# Patient Record
Sex: MALE | Race: WHITE | NOT HISPANIC OR LATINO | Employment: FULL TIME | URBAN - METROPOLITAN AREA
[De-identification: names, ages, dates, MRNs, and addresses within clinical notes are randomized per-mention and may not be internally consistent; named-entity substitution may affect disease eponyms.]

---

## 2018-04-27 ENCOUNTER — OFFICE VISIT CONVERTED (OUTPATIENT)
Dept: FAMILY MEDICINE CLINIC | Facility: CLINIC | Age: 48
End: 2018-04-27
Attending: NURSE PRACTITIONER

## 2018-10-29 ENCOUNTER — OFFICE VISIT CONVERTED (OUTPATIENT)
Dept: FAMILY MEDICINE CLINIC | Facility: CLINIC | Age: 48
End: 2018-10-29
Attending: NURSE PRACTITIONER

## 2019-04-16 ENCOUNTER — HOSPITAL ENCOUNTER (OUTPATIENT)
Dept: OTHER | Facility: HOSPITAL | Age: 49
Discharge: HOME | End: 2019-04-16

## 2019-04-16 LAB
ALBUMIN SERPL-MCNC: 4.3 G/DL (ref 3.5–5)
ALBUMIN/GLOB SERPL: 1.7 {RATIO} (ref 1.4–2.6)
ALP SERPL-CCNC: 46 U/L (ref 53–128)
ALT SERPL-CCNC: 65 U/L (ref 10–40)
ANION GAP SERPL CALC-SCNC: 16 MMOL/L (ref 8–19)
AST SERPL-CCNC: 39 U/L (ref 15–50)
BASOPHILS # BLD AUTO: 0.04 10*3/UL (ref 0–0.2)
BASOPHILS NFR BLD AUTO: 0.6 % (ref 0–3)
BILIRUB SERPL-MCNC: 0.49 MG/DL (ref 0.2–1.3)
BUN SERPL-MCNC: 12 MG/DL (ref 5–25)
BUN/CREAT SERPL: 11 {RATIO} (ref 6–20)
CALCIUM SERPL-MCNC: 9 MG/DL (ref 8.7–10.4)
CHLORIDE SERPL-SCNC: 103 MMOL/L (ref 99–111)
CHOLEST SERPL-MCNC: 174 MG/DL (ref 107–200)
CHOLEST/HDLC SERPL: 4.6 {RATIO} (ref 3–6)
CONV ABS IMM GRAN: 0.01 10*3/UL (ref 0–0.2)
CONV CO2: 24 MMOL/L (ref 22–32)
CONV IMMATURE GRAN: 0.2 % (ref 0–1.8)
CONV TOTAL PROTEIN: 6.8 G/DL (ref 6.3–8.2)
CREAT UR-MCNC: 1.05 MG/DL (ref 0.7–1.2)
DEPRECATED RDW RBC AUTO: 40.4 FL (ref 35.1–43.9)
EOSINOPHIL # BLD AUTO: 0.16 10*3/UL (ref 0–0.7)
EOSINOPHIL # BLD AUTO: 2.6 % (ref 0–7)
ERYTHROCYTE [DISTWIDTH] IN BLOOD BY AUTOMATED COUNT: 11.8 % (ref 11.6–14.4)
EST. AVERAGE GLUCOSE BLD GHB EST-MCNC: 146 MG/DL
GFR SERPLBLD BASED ON 1.73 SQ M-ARVRAT: >60 ML/MIN/{1.73_M2}
GLOBULIN UR ELPH-MCNC: 2.5 G/DL (ref 2–3.5)
GLUCOSE SERPL-MCNC: 152 MG/DL (ref 70–99)
HBA1C MFR BLD: 15.2 G/DL (ref 14–18)
HBA1C MFR BLD: 6.7 % (ref 3.5–5.7)
HCT VFR BLD AUTO: 44.2 % (ref 42–52)
HDLC SERPL-MCNC: 38 MG/DL (ref 40–60)
LDLC SERPL CALC-MCNC: 86 MG/DL (ref 70–100)
LYMPHOCYTES # BLD AUTO: 1.73 10*3/UL (ref 1–5)
MCH RBC QN AUTO: 32.1 PG (ref 27–31)
MCHC RBC AUTO-ENTMCNC: 34.4 G/DL (ref 33–37)
MCV RBC AUTO: 93.4 FL (ref 80–96)
MONOCYTES # BLD AUTO: 0.57 10*3/UL (ref 0.2–1.2)
MONOCYTES NFR BLD AUTO: 9.2 % (ref 3–10)
NEUTROPHILS # BLD AUTO: 3.68 10*3/UL (ref 2–8)
NEUTROPHILS NFR BLD AUTO: 59.5 % (ref 30–85)
NRBC CBCN: 0 % (ref 0–0.7)
OSMOLALITY SERPL CALC.SUM OF ELEC: 291 MOSM/KG (ref 273–304)
PLATELET # BLD AUTO: 205 10*3/UL (ref 130–400)
PMV BLD AUTO: 11.2 FL (ref 9.4–12.4)
POTASSIUM SERPL-SCNC: 4.4 MMOL/L (ref 3.5–5.3)
RBC # BLD AUTO: 4.73 10*6/UL (ref 4.7–6.1)
SODIUM SERPL-SCNC: 139 MMOL/L (ref 135–147)
TRIGL SERPL-MCNC: 248 MG/DL (ref 40–150)
TSH SERPL-ACNC: 6.3 M[IU]/L (ref 0.27–4.2)
VARIANT LYMPHS NFR BLD MANUAL: 27.9 % (ref 20–45)
VLDLC SERPL-MCNC: 50 MG/DL (ref 5–37)
WBC # BLD AUTO: 6.19 10*3/UL (ref 4.8–10.8)

## 2019-04-29 ENCOUNTER — OFFICE VISIT CONVERTED (OUTPATIENT)
Dept: FAMILY MEDICINE CLINIC | Facility: CLINIC | Age: 49
End: 2019-04-29
Attending: NURSE PRACTITIONER

## 2019-04-29 ENCOUNTER — CONVERSION ENCOUNTER (OUTPATIENT)
Dept: FAMILY MEDICINE CLINIC | Facility: CLINIC | Age: 49
End: 2019-04-29

## 2019-10-24 ENCOUNTER — HOSPITAL ENCOUNTER (OUTPATIENT)
Dept: OTHER | Facility: HOSPITAL | Age: 49
Discharge: HOME | End: 2019-10-24
Attending: NURSE PRACTITIONER

## 2019-10-24 LAB
ALBUMIN SERPL-MCNC: 4.8 G/DL (ref 3.5–5)
ALBUMIN/GLOB SERPL: 2 {RATIO} (ref 1.4–2.6)
ALP SERPL-CCNC: 54 U/L (ref 53–128)
ALT SERPL-CCNC: 48 U/L (ref 10–40)
ANION GAP SERPL CALC-SCNC: 19 MMOL/L (ref 8–19)
AST SERPL-CCNC: 30 U/L (ref 15–50)
BASOPHILS # BLD AUTO: 0.06 10*3/UL (ref 0–0.2)
BASOPHILS NFR BLD AUTO: 0.9 % (ref 0–3)
BILIRUB SERPL-MCNC: 0.65 MG/DL (ref 0.2–1.3)
BUN SERPL-MCNC: 16 MG/DL (ref 5–25)
BUN/CREAT SERPL: 16 {RATIO} (ref 6–20)
CALCIUM SERPL-MCNC: 9.4 MG/DL (ref 8.7–10.4)
CHLORIDE SERPL-SCNC: 100 MMOL/L (ref 99–111)
CHOLEST SERPL-MCNC: 178 MG/DL (ref 107–200)
CHOLEST/HDLC SERPL: 4.9 {RATIO} (ref 3–6)
CONV ABS IMM GRAN: 0.01 10*3/UL (ref 0–0.2)
CONV CO2: 24 MMOL/L (ref 22–32)
CONV CREATININE URINE, RANDOM: 71.2 MG/DL (ref 10–300)
CONV IMMATURE GRAN: 0.2 % (ref 0–1.8)
CONV MICROALBUM.,U,RANDOM: <12 MG/L (ref 0–20)
CONV TOTAL PROTEIN: 7.2 G/DL (ref 6.3–8.2)
CREAT UR-MCNC: 1.01 MG/DL (ref 0.7–1.2)
DEPRECATED RDW RBC AUTO: 41.7 FL (ref 35.1–43.9)
EOSINOPHIL # BLD AUTO: 0.14 10*3/UL (ref 0–0.7)
EOSINOPHIL # BLD AUTO: 2.1 % (ref 0–7)
ERYTHROCYTE [DISTWIDTH] IN BLOOD BY AUTOMATED COUNT: 12 % (ref 11.6–14.4)
EST. AVERAGE GLUCOSE BLD GHB EST-MCNC: 154 MG/DL
GFR SERPLBLD BASED ON 1.73 SQ M-ARVRAT: >60 ML/MIN/{1.73_M2}
GLOBULIN UR ELPH-MCNC: 2.4 G/DL (ref 2–3.5)
GLUCOSE SERPL-MCNC: 161 MG/DL (ref 70–99)
HBA1C MFR BLD: 7 % (ref 3.5–5.7)
HCT VFR BLD AUTO: 46.2 % (ref 42–52)
HDLC SERPL-MCNC: 36 MG/DL (ref 40–60)
HGB BLD-MCNC: 15.6 G/DL (ref 14–18)
LDLC SERPL CALC-MCNC: 76 MG/DL (ref 70–100)
LYMPHOCYTES # BLD AUTO: 1.87 10*3/UL (ref 1–5)
LYMPHOCYTES NFR BLD AUTO: 28.6 % (ref 20–45)
MCH RBC QN AUTO: 32.2 PG (ref 27–31)
MCHC RBC AUTO-ENTMCNC: 33.8 G/DL (ref 33–37)
MCV RBC AUTO: 95.5 FL (ref 80–96)
MICROALBUMIN/CREAT UR: 16.9 MG/G{CRE} (ref 0–25)
MONOCYTES # BLD AUTO: 0.55 10*3/UL (ref 0.2–1.2)
MONOCYTES NFR BLD AUTO: 8.4 % (ref 3–10)
NEUTROPHILS # BLD AUTO: 3.91 10*3/UL (ref 2–8)
NEUTROPHILS NFR BLD AUTO: 59.8 % (ref 30–85)
NRBC CBCN: 0 % (ref 0–0.7)
OSMOLALITY SERPL CALC.SUM OF ELEC: 291 MOSM/KG (ref 273–304)
PLATELET # BLD AUTO: 210 10*3/UL (ref 130–400)
PMV BLD AUTO: 11.2 FL (ref 9.4–12.4)
POTASSIUM SERPL-SCNC: 4.7 MMOL/L (ref 3.5–5.3)
RBC # BLD AUTO: 4.84 10*6/UL (ref 4.7–6.1)
SODIUM SERPL-SCNC: 138 MMOL/L (ref 135–147)
T4 FREE SERPL-MCNC: 1.5 NG/DL (ref 0.9–1.8)
TRIGL SERPL-MCNC: 332 MG/DL (ref 40–150)
TSH SERPL-ACNC: 3.37 M[IU]/L (ref 0.27–4.2)
VLDLC SERPL-MCNC: 66 MG/DL (ref 5–37)
WBC # BLD AUTO: 6.54 10*3/UL (ref 4.8–10.8)

## 2019-10-29 ENCOUNTER — OFFICE VISIT CONVERTED (OUTPATIENT)
Dept: FAMILY MEDICINE CLINIC | Facility: CLINIC | Age: 49
End: 2019-10-29
Attending: NURSE PRACTITIONER

## 2020-02-26 ENCOUNTER — HOSPITAL ENCOUNTER (OUTPATIENT)
Dept: URGENT CARE | Facility: CLINIC | Age: 50
Discharge: HOME | End: 2020-02-26
Attending: NURSE PRACTITIONER

## 2020-02-28 LAB — BACTERIA SPEC AEROBE CULT: NORMAL

## 2020-04-23 ENCOUNTER — HOSPITAL ENCOUNTER (OUTPATIENT)
Dept: OTHER | Facility: HOSPITAL | Age: 50
Discharge: HOME | End: 2020-04-23

## 2020-04-23 LAB
ALBUMIN SERPL-MCNC: 4.5 G/DL (ref 3.5–5)
ALBUMIN/GLOB SERPL: 1.7 {RATIO} (ref 1.4–2.6)
ALP SERPL-CCNC: 51 U/L (ref 53–128)
ALT SERPL-CCNC: 70 U/L (ref 10–40)
ANION GAP SERPL CALC-SCNC: 19 MMOL/L (ref 8–19)
AST SERPL-CCNC: 41 U/L (ref 15–50)
BASOPHILS # BLD AUTO: 0.05 10*3/UL (ref 0–0.2)
BASOPHILS NFR BLD AUTO: 0.7 % (ref 0–3)
BILIRUB SERPL-MCNC: 0.65 MG/DL (ref 0.2–1.3)
BUN SERPL-MCNC: 15 MG/DL (ref 5–25)
BUN/CREAT SERPL: 15 {RATIO} (ref 6–20)
CALCIUM SERPL-MCNC: 9.2 MG/DL (ref 8.7–10.4)
CHLORIDE SERPL-SCNC: 101 MMOL/L (ref 99–111)
CHOLEST SERPL-MCNC: 179 MG/DL (ref 107–200)
CHOLEST/HDLC SERPL: 5.3 {RATIO} (ref 3–6)
CONV ABS IMM GRAN: 0.02 10*3/UL (ref 0–0.2)
CONV CO2: 21 MMOL/L (ref 22–32)
CONV IMMATURE GRAN: 0.3 % (ref 0–1.8)
CONV TOTAL PROTEIN: 7.2 G/DL (ref 6.3–8.2)
CREAT UR-MCNC: 0.98 MG/DL (ref 0.7–1.2)
DEPRECATED RDW RBC AUTO: 40.1 FL (ref 35.1–43.9)
EOSINOPHIL # BLD AUTO: 0.25 10*3/UL (ref 0–0.7)
EOSINOPHIL # BLD AUTO: 3.5 % (ref 0–7)
ERYTHROCYTE [DISTWIDTH] IN BLOOD BY AUTOMATED COUNT: 11.7 % (ref 11.6–14.4)
EST. AVERAGE GLUCOSE BLD GHB EST-MCNC: 151 MG/DL
GFR SERPLBLD BASED ON 1.73 SQ M-ARVRAT: >60 ML/MIN/{1.73_M2}
GLOBULIN UR ELPH-MCNC: 2.7 G/DL (ref 2–3.5)
GLUCOSE SERPL-MCNC: 170 MG/DL (ref 70–99)
HBA1C MFR BLD: 6.9 % (ref 3.5–5.7)
HCT VFR BLD AUTO: 45.6 % (ref 42–52)
HDLC SERPL-MCNC: 34 MG/DL (ref 40–60)
HGB BLD-MCNC: 15.6 G/DL (ref 14–18)
LDLC SERPL CALC-MCNC: 104 MG/DL (ref 70–100)
LYMPHOCYTES # BLD AUTO: 1.54 10*3/UL (ref 1–5)
LYMPHOCYTES NFR BLD AUTO: 21.8 % (ref 20–45)
MCH RBC QN AUTO: 31.9 PG (ref 27–31)
MCHC RBC AUTO-ENTMCNC: 34.2 G/DL (ref 33–37)
MCV RBC AUTO: 93.3 FL (ref 80–96)
MONOCYTES # BLD AUTO: 0.65 10*3/UL (ref 0.2–1.2)
MONOCYTES NFR BLD AUTO: 9.2 % (ref 3–10)
NEUTROPHILS # BLD AUTO: 4.56 10*3/UL (ref 2–8)
NEUTROPHILS NFR BLD AUTO: 64.5 % (ref 30–85)
NRBC CBCN: 0 % (ref 0–0.7)
OSMOLALITY SERPL CALC.SUM OF ELEC: 289 MOSM/KG (ref 273–304)
PLATELET # BLD AUTO: 237 10*3/UL (ref 130–400)
PMV BLD AUTO: 10.6 FL (ref 9.4–12.4)
POTASSIUM SERPL-SCNC: 4.2 MMOL/L (ref 3.5–5.3)
RBC # BLD AUTO: 4.89 10*6/UL (ref 4.7–6.1)
SODIUM SERPL-SCNC: 137 MMOL/L (ref 135–147)
TRIGL SERPL-MCNC: 206 MG/DL (ref 40–150)
TSH SERPL-ACNC: 3.45 M[IU]/L (ref 0.27–4.2)
VLDLC SERPL-MCNC: 41 MG/DL (ref 5–37)
WBC # BLD AUTO: 7.07 10*3/UL (ref 4.8–10.8)

## 2020-05-01 ENCOUNTER — OFFICE VISIT CONVERTED (OUTPATIENT)
Dept: FAMILY MEDICINE CLINIC | Facility: CLINIC | Age: 50
End: 2020-05-01
Attending: NURSE PRACTITIONER

## 2020-08-03 ENCOUNTER — HOSPITAL ENCOUNTER (OUTPATIENT)
Dept: CT IMAGING | Facility: HOSPITAL | Age: 50
Discharge: HOME | End: 2020-08-03
Attending: NURSE PRACTITIONER

## 2020-09-07 ENCOUNTER — HOSPITAL ENCOUNTER (OUTPATIENT)
Dept: PREADMISSION TESTING | Facility: HOSPITAL | Age: 50
Discharge: HOME | End: 2020-09-07
Attending: OTOLARYNGOLOGY

## 2020-09-08 LAB — SARS-COV-2 RNA SPEC QL NAA+PROBE: NOT DETECTED

## 2020-09-09 ENCOUNTER — HOSPITAL ENCOUNTER (OUTPATIENT)
Dept: OTHER | Facility: HOSPITAL | Age: 50
Discharge: HOME | End: 2020-09-09
Attending: OTOLARYNGOLOGY

## 2020-09-09 LAB
ANION GAP SERPL CALC-SCNC: 15 MMOL/L (ref 8–19)
APTT BLD: 23.2 S (ref 22.2–34.2)
BASOPHILS # BLD AUTO: 0.04 10*3/UL (ref 0–0.2)
BASOPHILS NFR BLD AUTO: 0.6 % (ref 0–3)
BUN SERPL-MCNC: 9 MG/DL (ref 5–25)
BUN/CREAT SERPL: 10 {RATIO} (ref 6–20)
CALCIUM SERPL-MCNC: 9.6 MG/DL (ref 8.7–10.4)
CHLORIDE SERPL-SCNC: 101 MMOL/L (ref 99–111)
CONV ABS IMM GRAN: 0.05 10*3/UL (ref 0–0.2)
CONV CO2: 25 MMOL/L (ref 22–32)
CONV IMMATURE GRAN: 0.7 % (ref 0–1.8)
CREAT UR-MCNC: 0.91 MG/DL (ref 0.7–1.2)
DEPRECATED RDW RBC AUTO: 39.2 FL (ref 35.1–43.9)
EOSINOPHIL # BLD AUTO: 0.16 10*3/UL (ref 0–0.7)
EOSINOPHIL # BLD AUTO: 2.3 % (ref 0–7)
ERYTHROCYTE [DISTWIDTH] IN BLOOD BY AUTOMATED COUNT: 11.5 % (ref 11.6–14.4)
GFR SERPLBLD BASED ON 1.73 SQ M-ARVRAT: >60 ML/MIN/{1.73_M2}
GLUCOSE SERPL-MCNC: 235 MG/DL (ref 70–99)
HCT VFR BLD AUTO: 45.8 % (ref 42–52)
HGB BLD-MCNC: 15.8 G/DL (ref 14–18)
INR PPP: 0.94 (ref 2–3)
LYMPHOCYTES # BLD AUTO: 1.85 10*3/UL (ref 1–5)
LYMPHOCYTES NFR BLD AUTO: 26.8 % (ref 20–45)
MCH RBC QN AUTO: 32.4 PG (ref 27–31)
MCHC RBC AUTO-ENTMCNC: 34.5 G/DL (ref 33–37)
MCV RBC AUTO: 93.9 FL (ref 80–96)
MONOCYTES # BLD AUTO: 0.54 10*3/UL (ref 0.2–1.2)
MONOCYTES NFR BLD AUTO: 7.8 % (ref 3–10)
NEUTROPHILS # BLD AUTO: 4.26 10*3/UL (ref 2–8)
NEUTROPHILS NFR BLD AUTO: 61.8 % (ref 30–85)
NRBC CBCN: 0 % (ref 0–0.7)
OSMOLALITY SERPL CALC.SUM OF ELEC: 290 MOSM/KG (ref 273–304)
PLATELET # BLD AUTO: 234 10*3/UL (ref 130–400)
PMV BLD AUTO: 11 FL (ref 9.4–12.4)
POTASSIUM SERPL-SCNC: 4.2 MMOL/L (ref 3.5–5.3)
PROTHROMBIN TIME: 10.3 S (ref 9.4–12)
RBC # BLD AUTO: 4.88 10*6/UL (ref 4.7–6.1)
SODIUM SERPL-SCNC: 137 MMOL/L (ref 135–147)
T4 FREE SERPL-MCNC: 1.6 NG/DL (ref 0.9–1.8)
TSH SERPL-ACNC: 1.41 M[IU]/L (ref 0.27–4.2)
WBC # BLD AUTO: 6.9 10*3/UL (ref 4.8–10.8)

## 2020-09-11 ENCOUNTER — HOSPITAL ENCOUNTER (OUTPATIENT)
Dept: PERIOP | Facility: HOSPITAL | Age: 50
Setting detail: HOSPITAL OUTPATIENT SURGERY
Discharge: HOME | End: 2020-09-11
Attending: OTOLARYNGOLOGY

## 2020-09-11 LAB — GLUCOSE BLD-MCNC: 178 MG/DL (ref 70–99)

## 2020-11-09 ENCOUNTER — HOSPITAL ENCOUNTER (OUTPATIENT)
Dept: OTHER | Facility: HOSPITAL | Age: 50
Discharge: HOME | End: 2020-11-09
Attending: NURSE PRACTITIONER

## 2020-11-09 LAB
ALBUMIN SERPL-MCNC: 4.4 G/DL (ref 3.5–5)
ALBUMIN/GLOB SERPL: 1.7 {RATIO} (ref 1.4–2.6)
ALP SERPL-CCNC: 49 U/L (ref 53–128)
ALT SERPL-CCNC: 60 U/L (ref 10–40)
ANION GAP SERPL CALC-SCNC: 19 MMOL/L (ref 8–19)
AST SERPL-CCNC: 35 U/L (ref 15–50)
BILIRUB SERPL-MCNC: 0.74 MG/DL (ref 0.2–1.3)
BUN SERPL-MCNC: 14 MG/DL (ref 5–25)
BUN/CREAT SERPL: 14 {RATIO} (ref 6–20)
CALCIUM SERPL-MCNC: 9.7 MG/DL (ref 8.7–10.4)
CHLORIDE SERPL-SCNC: 103 MMOL/L (ref 99–111)
CHOLEST SERPL-MCNC: 175 MG/DL (ref 107–200)
CHOLEST/HDLC SERPL: 4.9 {RATIO} (ref 3–6)
CONV CO2: 21 MMOL/L (ref 22–32)
CONV CREATININE URINE, RANDOM: 244.9 MG/DL (ref 10–300)
CONV MICROALBUM.,U,RANDOM: 18.1 MG/L (ref 0–20)
CONV TOTAL PROTEIN: 7 G/DL (ref 6.3–8.2)
CREAT UR-MCNC: 1 MG/DL (ref 0.7–1.2)
EST. AVERAGE GLUCOSE BLD GHB EST-MCNC: 171 MG/DL
GFR SERPLBLD BASED ON 1.73 SQ M-ARVRAT: >60 ML/MIN/{1.73_M2}
GLOBULIN UR ELPH-MCNC: 2.6 G/DL (ref 2–3.5)
GLUCOSE SERPL-MCNC: 204 MG/DL (ref 70–99)
HBA1C MFR BLD: 7.6 % (ref 3.5–5.7)
HDLC SERPL-MCNC: 36 MG/DL (ref 40–60)
LDLC SERPL CALC-MCNC: 96 MG/DL (ref 70–100)
MICROALBUMIN/CREAT UR: 7.4 MG/G{CRE} (ref 0–25)
OSMOLALITY SERPL CALC.SUM OF ELEC: 294 MOSM/KG (ref 273–304)
POTASSIUM SERPL-SCNC: 4 MMOL/L (ref 3.5–5.3)
SODIUM SERPL-SCNC: 139 MMOL/L (ref 135–147)
T4 FREE SERPL-MCNC: 1.7 NG/DL (ref 0.9–1.8)
TRIGL SERPL-MCNC: 217 MG/DL (ref 40–150)
TSH SERPL-ACNC: 1.55 M[IU]/L (ref 0.27–4.2)
VLDLC SERPL-MCNC: 43 MG/DL (ref 5–37)

## 2020-11-11 ENCOUNTER — OFFICE VISIT CONVERTED (OUTPATIENT)
Dept: FAMILY MEDICINE CLINIC | Facility: CLINIC | Age: 50
End: 2020-11-11
Attending: NURSE PRACTITIONER

## 2020-12-24 ENCOUNTER — HOSPITAL ENCOUNTER (OUTPATIENT)
Dept: OTHER | Facility: HOSPITAL | Age: 50
Discharge: HOME | End: 2020-12-24
Attending: INTERNAL MEDICINE

## 2021-01-18 ENCOUNTER — HOSPITAL ENCOUNTER (OUTPATIENT)
Dept: OTHER | Facility: HOSPITAL | Age: 51
Discharge: HOME | End: 2021-01-18
Attending: INTERNAL MEDICINE

## 2021-04-19 ENCOUNTER — HOSPITAL ENCOUNTER (OUTPATIENT)
Dept: OTHER | Facility: HOSPITAL | Age: 51
Discharge: HOME | End: 2021-04-19

## 2021-04-19 LAB
ALBUMIN SERPL-MCNC: 4.5 G/DL (ref 3.5–5)
ALBUMIN/GLOB SERPL: 1.8 {RATIO} (ref 1.4–2.6)
ALP SERPL-CCNC: 53 U/L (ref 53–128)
ALT SERPL-CCNC: 57 U/L (ref 10–40)
ANION GAP SERPL CALC-SCNC: 16 MMOL/L (ref 8–19)
AST SERPL-CCNC: 35 U/L (ref 15–50)
BASOPHILS # BLD AUTO: 0.05 10*3/UL (ref 0–0.2)
BASOPHILS NFR BLD AUTO: 0.7 % (ref 0–3)
BILIRUB SERPL-MCNC: 0.54 MG/DL (ref 0.2–1.3)
BUN SERPL-MCNC: 14 MG/DL (ref 5–25)
BUN/CREAT SERPL: 15 {RATIO} (ref 6–20)
CALCIUM SERPL-MCNC: 9 MG/DL (ref 8.7–10.4)
CHLORIDE SERPL-SCNC: 101 MMOL/L (ref 99–111)
CHOLEST SERPL-MCNC: 171 MG/DL (ref 107–200)
CHOLEST/HDLC SERPL: 4.9 {RATIO} (ref 3–6)
CONV ABS IMM GRAN: 0.02 10*3/UL (ref 0–0.2)
CONV CO2: 24 MMOL/L (ref 22–32)
CONV IMMATURE GRAN: 0.3 % (ref 0–1.8)
CONV TOTAL PROTEIN: 7 G/DL (ref 6.3–8.2)
CREAT UR-MCNC: 0.95 MG/DL (ref 0.7–1.2)
DEPRECATED RDW RBC AUTO: 39.6 FL (ref 35.1–43.9)
EOSINOPHIL # BLD AUTO: 0.14 10*3/UL (ref 0–0.7)
EOSINOPHIL # BLD AUTO: 2 % (ref 0–7)
ERYTHROCYTE [DISTWIDTH] IN BLOOD BY AUTOMATED COUNT: 11.7 % (ref 11.6–14.4)
GFR SERPLBLD BASED ON 1.73 SQ M-ARVRAT: >60 ML/MIN/{1.73_M2}
GLOBULIN UR ELPH-MCNC: 2.5 G/DL (ref 2–3.5)
GLUCOSE SERPL-MCNC: 234 MG/DL (ref 70–99)
HCT VFR BLD AUTO: 44.8 % (ref 42–52)
HDLC SERPL-MCNC: 35 MG/DL (ref 40–60)
HGB BLD-MCNC: 15.6 G/DL (ref 14–18)
LDLC SERPL CALC-MCNC: 74 MG/DL (ref 70–100)
LYMPHOCYTES # BLD AUTO: 1.95 10*3/UL (ref 1–5)
LYMPHOCYTES NFR BLD AUTO: 27.7 % (ref 20–45)
MCH RBC QN AUTO: 32.3 PG (ref 27–31)
MCHC RBC AUTO-ENTMCNC: 34.8 G/DL (ref 33–37)
MCV RBC AUTO: 92.8 FL (ref 80–96)
MONOCYTES # BLD AUTO: 0.5 10*3/UL (ref 0.2–1.2)
MONOCYTES NFR BLD AUTO: 7.1 % (ref 3–10)
NEUTROPHILS # BLD AUTO: 4.39 10*3/UL (ref 2–8)
NEUTROPHILS NFR BLD AUTO: 62.2 % (ref 30–85)
NRBC CBCN: 0 % (ref 0–0.7)
OSMOLALITY SERPL CALC.SUM OF ELEC: 290 MOSM/KG (ref 273–304)
PLATELET # BLD AUTO: 203 10*3/UL (ref 130–400)
PMV BLD AUTO: 10.9 FL (ref 9.4–12.4)
POTASSIUM SERPL-SCNC: 4.6 MMOL/L (ref 3.5–5.3)
RBC # BLD AUTO: 4.83 10*6/UL (ref 4.7–6.1)
SODIUM SERPL-SCNC: 136 MMOL/L (ref 135–147)
TRIGL SERPL-MCNC: 309 MG/DL (ref 40–150)
TSH SERPL-ACNC: 1.66 M[IU]/L (ref 0.27–4.2)
VLDLC SERPL-MCNC: 62 MG/DL (ref 5–37)
WBC # BLD AUTO: 7.05 10*3/UL (ref 4.8–10.8)

## 2021-04-20 LAB
EST. AVERAGE GLUCOSE BLD GHB EST-MCNC: 180 MG/DL
HBA1C MFR BLD: 7.9 % (ref 3.5–5.7)

## 2021-05-12 ENCOUNTER — OFFICE VISIT CONVERTED (OUTPATIENT)
Dept: FAMILY MEDICINE CLINIC | Facility: CLINIC | Age: 51
End: 2021-05-12
Attending: NURSE PRACTITIONER

## 2021-05-13 NOTE — PROGRESS NOTES
Progress Note      Patient Name: Jaciel Shaw   Patient ID: [id number]   Sex: Male   YOB: 1970    Primary Care Provider: Ryan AVALOS    Visit Date: November 11, 2020    Provider: BAILEY Whitaker   Location: [facility] [place]   Location Address: [address] [place] [address], [address] [place] [address]  [id number]   Location Phone: [phone]          Chief Complaint  · 6 month follow up       History Of Present Illness  Jaciel Shaw is a 50 year old /White male who presents for evaluation and treatment of:      Patient presents to the clinic today for a 6 month follow up. He denies any issues or complaints at this time. He does need refills. We discussed his labs. His thyroid levels were within range. His A1C was elevated at 7.6%. He admits to eating unhealthier over the last few weeks. He does not check his sugars at home. He is taking the metformin 1000 BID. We discussed dietary modifications and limiting his sugar and carb intake. He wants to try this before new medications.  His blood pressure is well controlled today 124/82.       Past Medical History  Disease Name Date Onset Notes   Diabetes mellitus, type 2 10/27/2017 --    Diabetes Mellitus, Type II-Diet controlled 04/26/2016 --    Hyperlipidemia 10/27/2017 --    Hyperlipidemia, unspecified hyperlipidemia type 04/28/2016 --    Hypothyroidism 10/27/2017 --    Right displaced 5th metacarpal neck fracture.  11/13/2015 --    Seasonal allergies --  --          Past Surgical History  Procedure Name Date Notes   EYE SURGERY 1987 detached retina         Medication List  Name Date Started Instructions   Claritin 10 mg oral tablet  take 1 tablet (10 mg) by oral route once daily   levothyroxine 75 mcg oral tablet 11/11/2020 take 1 tablet (75 mcg) by oral route once daily for 30 days   lisinopril 5 mg oral tablet 11/11/2020 take 1 tablet (5 mg) by oral route once daily for 30 days   metformin 500 mg oral tablet extended  "release 24 hr 11/11/2020 take 2 tablets by oral route 2 times a day for 30 days         Allergy List  Allergen Name Date Reaction Notes   Keflex --  --  --          Family Medical History  Disease Name Relative/Age Notes   Heart Disease Mother/   Mother         Social History  Finding Status Start/Stop Quantity Notes   Alcohol Former --/-- --  --    lives with spouse --  --/-- --  --    . --  --/-- --  --    Moderate Amount of Exercise (1-3 times weekly) --  --/-- --  --    Recreational Drug Use Never --/-- --  no   Tobacco Former --/-- --  former smoker         Immunizations  NameDate Admin Mfg Trade Name Lot Number Route Inj VIS Given VIS Publication   [id number]/24/2019 SKB Fluarix, quadrivalent, preservative free [id number] NE NE 10/29/2019    Comments:          Review of Systems  · Constitutional  o Denies  o : fatigue, night sweats  · Eyes  o Denies  o : double vision, blurred vision  · HENT  o Denies  o : vertigo, recent head injury  · Breasts  o Denies  o : abnormal changes in breast size, additional breast symptoms except as noted in the HPI  · Cardiovascular  o Denies  o : chest pain, irregular heart beats  · Respiratory  o Denies  o : shortness of breath, productive cough  · Gastrointestinal  o Denies  o : nausea, vomiting  · Genitourinary  o Denies  o : dysuria, urinary retention  · Integument  o Denies  o : hair growth change, new skin lesions  · Neurologic  o Denies  o : altered mental status, seizures  · Musculoskeletal  o Denies  o : joint swelling, limitation of motion  · Endocrine  o Denies  o : cold intolerance, heat intolerance  · Heme-Lymph  o Denies  o : petechiae, lymph node enlargement or tenderness  · Allergic-Immunologic  o Denies  o : frequent illnesses      Vitals  Date Time BP Position Site L\R Cuff Size HR RR TEMP (F) WT  HT  BMI kg/m2 BSA m2 O2 Sat FR L/min FiO2 HC       11/11/2020 01:40 /82 Sitting    80 - R  97 217lbs 0oz 5'  9\" 32.04 2.19 97 %            Physical " Examination  · Constitutional  o Appearance  o : well-nourished, in no acute distress  · Eyes  o Conjunctivae  o : conjunctivae normal  o Sclerae  o : sclerae white  o Pupils and Irises  o : pupils equal and round  o Eyelids/Ocular Adnexae  o : eyelid appearance normal, no exudates present  · Neck  o Inspection/Palpation  o : normal appearance, no masses or tenderness, trachea midline  o Range of Motion  o : cervical range of motion within normal limits  o Thyroid  o : gland size normal, nontender, no nodules or masses present on palpation  · Respiratory  o Respiratory Effort  o : breathing unlabored  o Inspection of Chest  o : normal appearance  o Auscultation of Lungs  o : normal breath sounds throughout inspiration and expiration  · Cardiovascular  o Heart  o :   § Auscultation of Heart  § : regular rate and rhythm, no murmurs, gallops or rubs  o Peripheral Vascular System  o :   § Pedal Pulses  § : pulses 2 bilaterally  § Extremities  § : no clubbing or edema  · Lymphatic  o Neck  o : no lymphadenopathy present  · Skin and Subcutaneous Tissue  o General Inspection  o : no rashes or lesions present, no areas of discoloration  o Body Hair  o : hair normal for age, general body hair distribution normal for age  o Digits and Nails  o : no clubbing, cyanosis, deformities or edema present, normal appearing nails  · Neurologic  o Mental Status Examination  o :   § Orientation  § : grossly oriented to person, place and time  o Gait and Station  o : normal gait, able to stand without difficulty  · Psychiatric  o Judgement and Insight  o : judgment and insight intact  o Mood and Affect  o : mood normal, affect appropriate  o Presence of Abnormal Thoughts  o : no hallucinations, no delusions present, no psychotic thoughts  · Left DM Foot Exam  o Sensation  o : normal sensory exam perceptible to 10-gram nylon monofilament exam (5/5), vibration and light touch.  o Visual Inspection  o : visual inspection is normal with no  signs of breakdown, ulcerations or deformities unless otherwise noted.   o Vascular  o : palpable dorsalis pedis and posterir tibialis pulses present, normal capillary refill  · Right DM Foot Exam  o Sensation  o : normal sensory exam perceptible to 10-gram nylon monofilament exam (5/5), vibration and light touch.  o Visual Inspection  o : visual inspection is normal with no signs of breakdown, ulcerations or deformities unless otherwise noted.   o Vascular  o : palpable dorsalis pedis and posterir tibialis pulses present, normal capillary refill                Assessment  · Screening for depression     V79.0/Z13.89  · Diabetes mellitus, type 2     250.00/E11.9  · Essential hypertension     401.9/I10  · Hyperlipidemia     272.4/E78.5      Plan  · Orders  o Annual depression screening, 15 minutes (, [address]) - V79.0/Z13.89 - 11/11/2020  o ACO-18: Negative screen for clinical depression using a standardized tool () - V79.0/Z13.89 - 11/11/2020  o Diabetic Foot (Motor and Sensory) Exam Completed [facility] (, , 2028F) - 250.00/E11.9 - 11/11/2020  o ACO-39: Current medications updated and reviewed (, 1159F) - - 11/11/2020  o ACO-18: Negative screen for clinical depression using a standardized tool () - - 11/11/2020  o ACO-40: Depression Remission at 12 months as demonstrated by a 12 month repeat PHQ-9 of less than 5 () - - 11/11/2020  · Medications  o levothyroxine 75 mcg oral tablet   SIG: take 1 tablet (75 mcg) by oral route once daily for 30 days   DISP: (30) Tablet with 5 refills  Refilled on 11/11/2020     o lisinopril 5 mg oral tablet   SIG: take 1 tablet (5 mg) by oral route once daily for 30 days   DISP: (30) Tablet with 5 refills  Refilled on 11/11/2020     o metformin 500 mg oral tablet extended release 24 hr   SIG: take 2 tablets by oral route 2 times a day for 30 days   DISP: (120) Tablet with 5 refills  Refilled on 11/11/2020     o Medications have been Reconciled  o Transition of  Care or Provider Policy  · Instructions  o Depression Screen completed and scanned into the EMR under the designated folder within the patient's documents.  o Today's PHQ-9 result is _2__  o Continue blood sugar monitoring daily and record. Bring your log to office visits. Call the office for readings below 70 and above 250 or any complications.  o Daily foot care. Avoid walking barefoot. Annual Dilated Eye Exam.  o Discussed with patient blood pressure monitoring, hemoglobin A1C levels need to be below 7.0, and LDL (Lipid) goals below 70.  o Advised that cheeses and other sources of dairy fats, animal fats, fast food, and the extras (candy, pastries, pies, doughnuts and cookies) all contain LDL raising nutrients. Advised to increase fruits, vegetables, whole grains, and to monitor portion sizes.   o Patient was educated/instructed on their diagnosis, treatment and medications prior to discharge from the clinic today.  o Minutes spent with patient including greater than 50% in Education/Counseling/Care Coordination.  o Time spent with the patient was minutes, more than 50% face to face.  o Electronically Identified Patient Education Materials Provided Electronically            Electronically Signed by: BAILEY Whitaker -Author on November 11, 2020 03:56:38 PM

## 2021-05-13 NOTE — PROGRESS NOTES
Progress Note      Patient Name: Jaciel Shaw   Patient ID: [id number]   Sex: Male   YOB: 1970    Primary Care Provider: Ryan AVALOS    Visit Date: May 1, 2020    Provider: BAILEY Whitaker   Location: [facility]   Location Address: [address] [place] [address], [address]  [id number]   Location Phone: [phone]          Chief Complaint  · 6 month follow up for DM2, HTN, Hypothyroidism  · Medication refills  · Lab results      History Of Present Illness  Jaciel Shaw is a 50 year old /White male who presents for evaluation and treatment of:      Patient presents to the office today for six-month follow-up regarding his diabetes, hypothyroidism.  Patient denies any concerns or complaints at this time.  I did review his recent HRA labs that were completed at the hospital.  His A1c was 6.9% which was a reduction from 7.0% during his last visit.  Patient has also lost 9 pounds since his last visit by cutting out sodas including diet sodas.  Patient does state that he is needing refills of all his medications.       Past Medical History  Diabetes mellitus, type 2; Diabetes Mellitus, Type II-Diet controlled; Hyperlipidemia; Hyperlipidemia, unspecified hyperlipidemia type; Hypothyroidism; Right displaced 5th metacarpal neck fracture. ; Seasonal allergies         Past Surgical History  EYE SURGERY         Medication List  Claritin 10 mg oral tablet; levothyroxine 75 mcg oral tablet; lisinopril 5 mg oral tablet; metformin 500 mg oral tablet extended release 24 hr         Allergy List  Keflex         Family Medical History  Heart Disease         Social History  Alcohol (Former); lives with spouse; .; Moderate Amount of Exercise (1-3 times weekly); Recreational Drug Use (Never); Tobacco (Former)         Immunizations  Name Date Admin   Influenza          Review of Systems  · Constitutional  o Denies  o : fever, fatigue, weight loss, weight  "gain  · Cardiovascular  o Denies  o : lower extremity edema, claudication, chest pressure, palpitations  · Respiratory  o Denies  o : shortness of breath, wheezing, cough, hemoptysis, dyspnea on exertion  · Gastrointestinal  o Denies  o : nausea, vomiting, diarrhea, constipation, abdominal pain      Vitals  Date Time BP Position Site L\R Cuff Size HR RR TEMP (F) WT  HT  BMI kg/m2 BSA m2 O2 Sat        05/01/2020 07:36 /74 Sitting    95 - R 18 97.1 212lbs 0oz 5'  9\" 31.31 2.16 97 %          Physical Examination  · Constitutional  o Appearance  o : well-nourished, in no acute distress  · Neck  o Inspection/Palpation  o : normal appearance, no masses or tenderness, trachea midline  o Range of Motion  o : cervical range of motion within normal limits  o Thyroid  o : gland size normal, nontender, no nodules or masses present on palpation  · Respiratory  o Respiratory Effort  o : breathing unlabored  o Inspection of Chest  o : normal appearance  o Auscultation of Lungs  o : normal breath sounds throughout inspiration and expiration  · Cardiovascular  o Heart  o :   § Auscultation of Heart  § : regular rate and rhythm, no murmurs, gallops or rubs  o Peripheral Vascular System  o :   § Extremities  § : no clubbing or edema  · Skin and Subcutaneous Tissue  o General Inspection  o : no rashes or lesions present, no areas of discoloration  o Body Hair  o : hair normal for age, general body hair distribution normal for age  o Digits and Nails  o : no clubbing, cyanosis, deformities or edema present, normal appearing nails  · Neurologic  o Mental Status Examination  o :   § Orientation  § : grossly oriented to person, place and time  o Gait and Station  o : normal gait, able to stand without difficulty  · Psychiatric  o Judgement and Insight  o : judgment and insight intact  o Mood and Affect  o : mood normal, affect appropriate  o Presence of Abnormal Thoughts  o : no hallucinations, no delusions present, no psychotic " thoughts          Assessment  · Diabetes mellitus, type 2     250.00/E11.9  · Essential hypertension     401.9/I10  · Hypothyroidism     244.9/E03.9    Problems Reconciled  Plan  · Orders  o Diabetes 2 Panel (Urine Microalbumin, CMP, Lipid, A1c, ) [facility] (39219, 35725, 90194, 24256) - 250.00/E11.9 - 11/01/2020  o Thyroid Profile (46529, 22890, THYII) - 250.00/E11.9 - 11/01/2020  o ACO-17: Screened for tobacco use AND received tobacco cessation intervention (4004F) - - 05/01/2020  o ACO-39: Current medications updated and reviewed () - - 05/01/2020  o ACO-14: Influenza immunization administered or previously received () - - 05/01/2020  · Medications  o levothyroxine 75 mcg oral tablet   SIG: take 1 tablet (75 mcg) by oral route once daily for 30 days   DISP: (30) tablets with 5 refills  Refilled on 05/01/2020     o lisinopril 5 mg oral tablet   SIG: take 1 tablet (5 mg) by oral route once daily for 30 days   DISP: (30) tablets with 5 refills  Refilled on 05/01/2020     o metformin 500 mg oral tablet extended release 24 hr   SIG: take 2 tablets by oral route 2 times a day for 30 days   DISP: (120) tablets with 5 refills  Refilled on 05/01/2020     o Medications have been Reconciled  o Transition of Care or Provider Policy  · Instructions  o Continue blood sugar monitoring daily and record. Bring your log to office visits. Call the office for readings below 70 and above 250 or any complications.  o Daily foot care. Avoid walking barefoot. Annual Dilated Eye Exam.  o Discussed with patient blood pressure monitoring, hemoglobin A1C levels need to be below 7.0, and LDL (Lipid) goals below 70.  o Patient was educated/instructed on their diagnosis, treatment and medications prior to discharge from the clinic today.  o Time spent with the patient was minutes, more than 50% face to face.  · Disposition  o Call or Return if symptoms worsen or persist.  o follow up in 6 months            Electronically Signed by: Ryan  BAILEY Ambrose -Author on May 1, 2020 07:50:37 AM

## 2021-05-14 VITALS
SYSTOLIC BLOOD PRESSURE: 124 MMHG | OXYGEN SATURATION: 97 % | DIASTOLIC BLOOD PRESSURE: 82 MMHG | HEART RATE: 80 BPM | BODY MASS INDEX: 32.14 KG/M2 | TEMPERATURE: 97 F | HEIGHT: 69 IN | WEIGHT: 217 LBS

## 2021-05-15 VITALS
DIASTOLIC BLOOD PRESSURE: 78 MMHG | RESPIRATION RATE: 16 BRPM | TEMPERATURE: 96.7 F | HEIGHT: 69 IN | SYSTOLIC BLOOD PRESSURE: 120 MMHG | WEIGHT: 221 LBS | BODY MASS INDEX: 32.73 KG/M2 | OXYGEN SATURATION: 98 % | HEART RATE: 84 BPM

## 2021-05-15 VITALS
TEMPERATURE: 97.1 F | HEART RATE: 95 BPM | BODY MASS INDEX: 31.4 KG/M2 | OXYGEN SATURATION: 97 % | SYSTOLIC BLOOD PRESSURE: 120 MMHG | DIASTOLIC BLOOD PRESSURE: 74 MMHG | RESPIRATION RATE: 18 BRPM | WEIGHT: 212 LBS | HEIGHT: 69 IN

## 2021-05-15 VITALS
SYSTOLIC BLOOD PRESSURE: 120 MMHG | OXYGEN SATURATION: 98 % | RESPIRATION RATE: 16 BRPM | WEIGHT: 219 LBS | BODY MASS INDEX: 32.44 KG/M2 | HEIGHT: 69 IN | DIASTOLIC BLOOD PRESSURE: 84 MMHG | HEART RATE: 94 BPM

## 2021-05-16 VITALS
HEIGHT: 69 IN | BODY MASS INDEX: 31.89 KG/M2 | OXYGEN SATURATION: 99 % | DIASTOLIC BLOOD PRESSURE: 88 MMHG | HEART RATE: 96 BPM | RESPIRATION RATE: 16 BRPM | TEMPERATURE: 97.1 F | WEIGHT: 215.31 LBS | SYSTOLIC BLOOD PRESSURE: 145 MMHG

## 2021-05-16 VITALS
HEART RATE: 94 BPM | TEMPERATURE: 96.6 F | WEIGHT: 215 LBS | SYSTOLIC BLOOD PRESSURE: 140 MMHG | BODY MASS INDEX: 31.84 KG/M2 | OXYGEN SATURATION: 97 % | RESPIRATION RATE: 16 BRPM | HEIGHT: 69 IN | DIASTOLIC BLOOD PRESSURE: 86 MMHG

## 2021-06-05 NOTE — PROGRESS NOTES
Progress Note      Patient Name: Jaciel Shaw   Patient ID: [id number]   Sex: Male   YOB: 1970    Primary Care Provider: Ryan AVALOS    Visit Date: May 12, 2021    Provider: BAILEY Whitaker   Location: [facility]   Location Address: [address] [place] [address], [address]  [place], [place]  [id number]   Location Phone: [phone]          Chief Complaint  · Physical and refills      History Of Present Illness  Jaciel Shaw is a 51 year old /White male who presents for evaluation and treatment of:      Patient presents to the office today for a wellness physical.  He states that he is doing well and denies any concerns or complaints at this time.  Patient does state that his father passed away last April and he is dealing with his uncle and grandfather who both live in nursing homes.  He states that this added stress has caused him to stress eat which is in turn to increase his A1c and his blood sugars.  Patient states that he is well aware of what has caused these to increase numbers and states that he is going to work on this to improve them.       Past Medical History  Disease Name Date Onset Notes   Diabetes mellitus, type 2 10/27/2017 --    Diabetes Mellitus, Type II-Diet controlled 04/26/2016 --    Hyperlipidemia 10/27/2017 --    Hyperlipidemia, unspecified hyperlipidemia type 04/28/2016 --    Hypothyroidism 10/27/2017 --    Right displaced 5th metacarpal neck fracture.  11/13/2015 --    Seasonal allergies --  --          Past Surgical History  Procedure Name Date Notes   EYE SURGERY 1987 detached retina         Medication List  Name Date Started Instructions   Claritin 10 mg oral tablet  take 1 tablet (10 mg) by oral route once daily   levothyroxine 75 mcg oral tablet 11/11/2020 take 1 tablet (75 mcg) by oral route once daily for 30 days   lisinopril 5 mg oral tablet 11/11/2020 take 1 tablet (5 mg) by oral route once daily for 30 days   metformin 500 mg oral  "tablet extended release 24 hr 11/11/2020 take 2 tablets by oral route 2 times a day for 30 days         Allergy List  Allergen Name Date Reaction Notes   Keflex --  --  --          Family Medical History  Disease Name Relative/Age Notes   Heart Disease Mother/   Mother         Social History  Finding Status Start/Stop Quantity Notes   Alcohol Former --/-- --  --    lives with spouse --  --/-- --  --    . --  --/-- --  --    Moderate Amount of Exercise (1-3 times weekly) --  --/-- --  --    Recreational Drug Use Never --/-- --  no   Tobacco Former --/-- --  former smoker         Immunizations  NameDate Admin Mfg Trade Name Lot Number Route Inj VIS Given VIS Publication   [id number]/15/2020 SKB Fluarix, quadrivalent, preservative free [id number] NE NE 05/12/2021    Comments:          Review of Systems  · Constitutional  o Denies  o : fever, fatigue, weight loss, weight gain  · Cardiovascular  o Denies  o : lower extremity edema, claudication, chest pressure, palpitations  · Respiratory  o Denies  o : shortness of breath, wheezing, cough, hemoptysis, dyspnea on exertion  · Gastrointestinal  o Denies  o : nausea, vomiting, diarrhea, constipation, abdominal pain      Vitals  Date Time BP Position Site L\R Cuff Size HR RR TEMP (F) WT  HT  BMI kg/m2 BSA m2 O2 Sat FR L/min FiO2 HC       05/12/2021 07:24 /84 Sitting    86 - R  97.4 214lbs 16oz 5'  9\" 31.75 2.18 98 %            Physical Examination  · Constitutional  o Appearance  o : well-nourished, in no acute distress  · Neck  o Inspection/Palpation  o : normal appearance, no masses or tenderness, trachea midline  o Range of Motion  o : cervical range of motion within normal limits  o Thyroid  o : gland size normal, nontender, no nodules or masses present on palpation  · Respiratory  o Respiratory Effort  o : breathing unlabored  o Inspection of Chest  o : normal appearance  o Auscultation of Lungs  o : normal breath sounds throughout inspiration and " expiration  · Cardiovascular  o Heart  o :   § Auscultation of Heart  § : regular rate and rhythm, no murmurs, gallops or rubs  o Peripheral Vascular System  o :   § Extremities  § : no clubbing or edema  · Gastrointestinal  o Abdominal Examination  o : abdomen nontender to palpation, tone normal without rigidity or guarding, no masses present, bowel sounds present in all four quadrants  · Skin and Subcutaneous Tissue  o General Inspection  o : no rashes or lesions present, no areas of discoloration  o Body Hair  o : hair normal for age, general body hair distribution normal for age  o Digits and Nails  o : no clubbing, cyanosis, deformities or edema present, normal appearing nails  · Neurologic  o Mental Status Examination  o :   § Orientation  § : grossly oriented to person, place and time  o Gait and Station  o : normal gait, able to stand without difficulty  · Psychiatric  o Judgement and Insight  o : judgment and insight intact  o Mood and Affect  o : mood normal, affect appropriate  o Presence of Abnormal Thoughts  o : no hallucinations, no delusions present, no psychotic thoughts          Assessment  · Diabetes mellitus, type 2     250.00/E11.9  · Hypothyroidism     244.9/E03.9  · Well adult exam     V70.0/Z00.00      Plan  · Orders  o ACO-14: Influenza immunization administered or previously received [facility] () - - 05/12/2021  o ACO-39: Current medications updated and reviewed (, 1159F) - - 05/12/2021  · Medications  o Claritin 10 mg oral tablet   SIG: take 1 tablet (10 mg) by oral route once daily   DISP: (90) Tablet with 3 refills  Adjusted on 05/12/2021     o metformin 1,000 mg oral tablet extended release 24hr   SIG: take 1 tablet (1,000 mg) by oral route 2 times per day with meals for 90 days   DISP: (180) Tablet with 1 refills  Adjusted on 05/12/2021     o levothyroxine 75 mcg oral tablet   SIG: take 1 tablet (75 mcg) by oral route once daily for 90 days   DISP: (90) Tablet with 1  refills  Refilled on 05/12/2021     o lisinopril 5 mg oral tablet   SIG: take 1 tablet (5 mg) by oral route once daily for 90 days   DISP: (90) Tablet with 1 refills  Refilled on 05/12/2021     o Medications have been Reconciled  o Transition of Care or Provider Policy  · Instructions  o Continue blood sugar monitoring daily and record. Bring your log to office visits. Call the office for readings below 70 and above 250 or any complications.  o Daily foot care. Avoid walking barefoot. Annual Dilated Eye Exam.  o Discussed with patient blood pressure monitoring, hemoglobin A1C levels need to be below 7.0, and LDL (Lipid) goals below 70.  o Patient was educated/instructed on their diagnosis, treatment and medications prior to discharge from the clinic today.  o Minutes spent with patient including greater than 50% in Education/Counseling/Care Coordination.  o Time spent with the patient was minutes, more than 50% face to face.  o Electronically Identified Patient Education Materials Provided Electronically  · Disposition  o Call or Return if symptoms worsen or persist.  o follow up in 6 months            Electronically Signed by: BAILEY Whitaker -Author on May 12, 2021 07:43:21 AM

## 2021-07-15 VITALS
BODY MASS INDEX: 31.84 KG/M2 | SYSTOLIC BLOOD PRESSURE: 122 MMHG | HEART RATE: 86 BPM | WEIGHT: 215 LBS | DIASTOLIC BLOOD PRESSURE: 84 MMHG | OXYGEN SATURATION: 98 % | TEMPERATURE: 97.4 F | HEIGHT: 69 IN

## 2021-11-12 DIAGNOSIS — Z13.220 SCREENING FOR LIPID DISORDERS: ICD-10-CM

## 2021-11-12 DIAGNOSIS — E03.9 HYPOTHYROIDISM, UNSPECIFIED TYPE: Primary | ICD-10-CM

## 2021-11-12 DIAGNOSIS — E11.9 TYPE 2 DIABETES MELLITUS WITHOUT COMPLICATION, WITHOUT LONG-TERM CURRENT USE OF INSULIN (HCC): ICD-10-CM

## 2021-11-15 ENCOUNTER — LAB (OUTPATIENT)
Dept: LAB | Facility: HOSPITAL | Age: 51
End: 2021-11-15

## 2021-11-15 DIAGNOSIS — E03.9 HYPOTHYROIDISM, UNSPECIFIED TYPE: ICD-10-CM

## 2021-11-15 DIAGNOSIS — Z13.220 SCREENING FOR LIPID DISORDERS: ICD-10-CM

## 2021-11-15 DIAGNOSIS — E11.9 TYPE 2 DIABETES MELLITUS WITHOUT COMPLICATION, WITHOUT LONG-TERM CURRENT USE OF INSULIN (HCC): ICD-10-CM

## 2021-11-15 LAB
ALBUMIN SERPL-MCNC: 4.5 G/DL (ref 3.5–5.2)
ALBUMIN/GLOB SERPL: 1.8 G/DL
ALP SERPL-CCNC: 50 U/L (ref 39–117)
ALT SERPL W P-5'-P-CCNC: 46 U/L (ref 1–41)
ANION GAP SERPL CALCULATED.3IONS-SCNC: 7 MMOL/L (ref 5–15)
AST SERPL-CCNC: 25 U/L (ref 1–40)
BILIRUB SERPL-MCNC: 0.4 MG/DL (ref 0–1.2)
BUN SERPL-MCNC: 17 MG/DL (ref 6–20)
BUN/CREAT SERPL: 15.7 (ref 7–25)
CALCIUM SPEC-SCNC: 9.5 MG/DL (ref 8.6–10.5)
CHLORIDE SERPL-SCNC: 100 MMOL/L (ref 98–107)
CHOLEST SERPL-MCNC: 192 MG/DL (ref 0–200)
CO2 SERPL-SCNC: 28 MMOL/L (ref 22–29)
CREAT SERPL-MCNC: 1.08 MG/DL (ref 0.76–1.27)
DEPRECATED RDW RBC AUTO: 39.1 FL (ref 37–54)
ERYTHROCYTE [DISTWIDTH] IN BLOOD BY AUTOMATED COUNT: 11.7 % (ref 12.3–15.4)
GFR SERPL CREATININE-BSD FRML MDRD: 73 ML/MIN/1.73
GLOBULIN UR ELPH-MCNC: 2.5 GM/DL
GLUCOSE SERPL-MCNC: 211 MG/DL (ref 65–99)
HBA1C MFR BLD: 7.9 % (ref 4.8–5.6)
HCT VFR BLD AUTO: 44.1 % (ref 37.5–51)
HDLC SERPL-MCNC: 34 MG/DL (ref 40–60)
HGB BLD-MCNC: 15.8 G/DL (ref 13–17.7)
LDLC SERPL CALC-MCNC: 97 MG/DL (ref 0–100)
LDLC/HDLC SERPL: 2.52 {RATIO}
MCH RBC QN AUTO: 32.7 PG (ref 26.6–33)
MCHC RBC AUTO-ENTMCNC: 35.8 G/DL (ref 31.5–35.7)
MCV RBC AUTO: 91.3 FL (ref 79–97)
PLATELET # BLD AUTO: 238 10*3/MM3 (ref 140–450)
PMV BLD AUTO: 11.4 FL (ref 6–12)
POTASSIUM SERPL-SCNC: 4.1 MMOL/L (ref 3.5–5.2)
PROT SERPL-MCNC: 7 G/DL (ref 6–8.5)
RBC # BLD AUTO: 4.83 10*6/MM3 (ref 4.14–5.8)
SODIUM SERPL-SCNC: 135 MMOL/L (ref 136–145)
T4 FREE SERPL-MCNC: 1.64 NG/DL (ref 0.93–1.7)
TRIGL SERPL-MCNC: 361 MG/DL (ref 0–150)
TSH SERPL DL<=0.05 MIU/L-ACNC: 2.5 UIU/ML (ref 0.27–4.2)
VLDLC SERPL-MCNC: 61 MG/DL (ref 5–40)
WBC # BLD AUTO: 8.32 10*3/MM3 (ref 3.4–10.8)

## 2021-11-15 PROCEDURE — 85027 COMPLETE CBC AUTOMATED: CPT

## 2021-11-15 PROCEDURE — 84443 ASSAY THYROID STIM HORMONE: CPT

## 2021-11-15 PROCEDURE — 80061 LIPID PANEL: CPT

## 2021-11-15 PROCEDURE — 80053 COMPREHEN METABOLIC PANEL: CPT

## 2021-11-15 PROCEDURE — 84439 ASSAY OF FREE THYROXINE: CPT

## 2021-11-15 PROCEDURE — 83036 HEMOGLOBIN GLYCOSYLATED A1C: CPT

## 2021-11-15 PROCEDURE — 36415 COLL VENOUS BLD VENIPUNCTURE: CPT

## 2021-11-17 ENCOUNTER — OFFICE VISIT (OUTPATIENT)
Dept: FAMILY MEDICINE CLINIC | Facility: CLINIC | Age: 51
End: 2021-11-17

## 2021-11-17 VITALS
DIASTOLIC BLOOD PRESSURE: 78 MMHG | BODY MASS INDEX: 30.95 KG/M2 | HEIGHT: 70 IN | OXYGEN SATURATION: 98 % | TEMPERATURE: 98.2 F | WEIGHT: 216.2 LBS | SYSTOLIC BLOOD PRESSURE: 120 MMHG | HEART RATE: 93 BPM

## 2021-11-17 DIAGNOSIS — E11.9 TYPE 2 DIABETES MELLITUS WITHOUT COMPLICATION, WITHOUT LONG-TERM CURRENT USE OF INSULIN (HCC): ICD-10-CM

## 2021-11-17 DIAGNOSIS — E03.9 HYPOTHYROIDISM, UNSPECIFIED TYPE: ICD-10-CM

## 2021-11-17 DIAGNOSIS — Z12.5 SCREENING FOR PROSTATE CANCER: Primary | ICD-10-CM

## 2021-11-17 DIAGNOSIS — E78.5 HYPERLIPIDEMIA, UNSPECIFIED HYPERLIPIDEMIA TYPE: ICD-10-CM

## 2021-11-17 DIAGNOSIS — I10 PRIMARY HYPERTENSION: ICD-10-CM

## 2021-11-17 PROBLEM — J30.2 SEASONAL ALLERGIC RHINITIS: Status: ACTIVE | Noted: 2021-11-17

## 2021-11-17 PROCEDURE — 99214 OFFICE O/P EST MOD 30 MIN: CPT | Performed by: NURSE PRACTITIONER

## 2021-11-17 NOTE — PROGRESS NOTES
"Chief Complaint  Hypothyroidism, Hypertension, and Diabetes    Subjective          Jaciel Shaw presents to [facility]  Patient presents to the office today for 6-month follow-up growing his diabetes, hypothyroidism and hyperlipidemia.  I did review recent lab work with patient including his A1c of 7.9%.  Patient does state that he has been under a lot of stress recently secondary to his grandfather passing away and a family pet of 17 years passing away.  Patient states that he will work on his diet and exercise to help lower the A1c.  I explained to the patient that if it had not lowered on his next visit that we would have to incorporate another medication.  Patient verbalized understanding.  He denies any other concerns or complaints at this time.  I did explain to the patient that his next lab work would include a PSA due to him being 52 years old I also discussed furring him for colonoscopy      Objective   Vital Signs:   /78 (BP Location: Right arm, Patient Position: Sitting, Cuff Size: Adult)   Pulse 93   Temp 98.2 °F (36.8 °C) (Temporal)   Ht 177.8 cm (70\")   Wt 98.1 kg (216 lb 3.2 oz)   SpO2 98%   BMI 31.02 kg/m²     Physical Exam  Vitals reviewed.   Constitutional:       Appearance: Normal appearance.   Cardiovascular:      Rate and Rhythm: Normal rate and regular rhythm.      Heart sounds: Normal heart sounds, S1 normal and S2 normal. No murmur heard.      Pulmonary:      Effort: Pulmonary effort is normal. No respiratory distress.      Breath sounds: Normal breath sounds.   Skin:     General: Skin is warm and dry.   Neurological:      Mental Status: He is alert and oriented to person, place, and time.   Psychiatric:         Attention and Perception: Attention normal.         Mood and Affect: Mood normal.         Behavior: Behavior normal.        Result Review :                Assessment and Plan    Diagnoses and all orders for this visit:    1. Screening " for prostate cancer (Primary)  -     PSA SCREENING; Future    2. Type 2 diabetes mellitus without complication, without long-term current use of insulin (HCC)  -     CBC (No Diff); Future  -     Comprehensive Metabolic Panel; Future  -     Hemoglobin A1c; Future    3. Hyperlipidemia, unspecified hyperlipidemia type  -     CBC (No Diff); Future  -     Comprehensive Metabolic Panel; Future  -     Lipid Panel; Future    4. Primary hypertension  -     CBC (No Diff); Future  -     Comprehensive Metabolic Panel; Future    5. Hypothyroidism, unspecified type  -     CBC (No Diff); Future  -     Comprehensive Metabolic Panel; Future  -     TSH+Free T4; Future        Follow Up   Return in about 6 months (around 5/17/2022) for Recheck.  Patient was given instructions and counseling regarding his condition or for health maintenance advice. Please see specific information pulled into the AVS if appropriate.

## 2021-11-19 RX ORDER — LEVOTHYROXINE SODIUM 0.07 MG/1
75 TABLET ORAL DAILY
Qty: 90 TABLET | Refills: 1 | Status: SHIPPED | OUTPATIENT
Start: 2021-11-19 | End: 2022-05-09 | Stop reason: SDUPTHER

## 2021-11-19 RX ORDER — LISINOPRIL 5 MG/1
5 TABLET ORAL DAILY
Qty: 90 TABLET | Refills: 1 | Status: SHIPPED | OUTPATIENT
Start: 2021-11-19 | End: 2022-05-09 | Stop reason: SDUPTHER

## 2021-11-19 RX ORDER — METFORMIN HYDROCHLORIDE 500 MG/1
1000 TABLET, EXTENDED RELEASE ORAL 2 TIMES DAILY WITH MEALS
Qty: 360 TABLET | Refills: 1 | Status: SHIPPED | OUTPATIENT
Start: 2021-11-19 | End: 2022-05-09 | Stop reason: SDUPTHER

## 2021-11-19 NOTE — TELEPHONE ENCOUNTER
Caller: Jaciel Shaw    Relationship: Self    Best call back number: [phone]    Requested Prescriptions:   Requested Prescriptions     Pending Prescriptions Disp Refills   • metFORMIN ER (GLUCOPHAGE-XR) 500 MG 24 hr tablet 360 tablet 0     Sig: Take 2 tablets by mouth 2 (Two) Times a Day With Meals.   • levothyroxine (SYNTHROID, LEVOTHROID) 75 MCG tablet 90 tablet 0     Sig: Take 1 tablet by mouth Daily.   • lisinopril (PRINIVIL,ZESTRIL) 5 MG tablet 90 tablet 0     Sig: Take 1 tablet by mouth Daily.        Pharmacy where request should be sent: [facility]     Additional details provided by patient:     Does the patient have less than a 3 day supply:  [x] Yes  [] No

## 2022-01-27 ENCOUNTER — TRANSCRIBE ORDERS (OUTPATIENT)
Dept: LAB | Facility: HOSPITAL | Age: 52
End: 2022-01-27

## 2022-01-27 ENCOUNTER — LAB (OUTPATIENT)
Dept: LAB | Facility: HOSPITAL | Age: 52
End: 2022-01-27

## 2022-01-27 DIAGNOSIS — Z01.818 PRE-OP TESTING: ICD-10-CM

## 2022-01-27 DIAGNOSIS — Z01.818 PRE-OP TESTING: Primary | ICD-10-CM

## 2022-01-27 PROCEDURE — C9803 HOPD COVID-19 SPEC COLLECT: HCPCS

## 2022-01-27 PROCEDURE — U0004 COV-19 TEST NON-CDC HGH THRU: HCPCS

## 2022-01-28 LAB — SARS-COV-2 RNA PNL SPEC NAA+PROBE: NOT DETECTED

## 2022-05-09 RX ORDER — METFORMIN HYDROCHLORIDE 500 MG/1
1000 TABLET, EXTENDED RELEASE ORAL 2 TIMES DAILY WITH MEALS
Qty: 360 TABLET | Refills: 1 | Status: SHIPPED | OUTPATIENT
Start: 2022-05-09 | End: 2022-05-23

## 2022-05-09 RX ORDER — LEVOTHYROXINE SODIUM 0.07 MG/1
75 TABLET ORAL DAILY
Qty: 90 TABLET | Refills: 1 | Status: SHIPPED | OUTPATIENT
Start: 2022-05-09 | End: 2022-11-15 | Stop reason: SDUPTHER

## 2022-05-09 RX ORDER — LISINOPRIL 5 MG/1
5 TABLET ORAL DAILY
Qty: 90 TABLET | Refills: 1 | Status: SHIPPED | OUTPATIENT
Start: 2022-05-09 | End: 2022-11-15 | Stop reason: SDUPTHER

## 2022-05-09 NOTE — TELEPHONE ENCOUNTER
----- Message from Jaciel Shaw sent at 5/9/2022  8:21 AM EDT -----  Regarding: Refill  I will need my Metformin, Levothyroxine and Lisinopril. Thank you

## 2022-05-12 ENCOUNTER — LAB (OUTPATIENT)
Dept: LAB | Facility: HOSPITAL | Age: 52
End: 2022-05-12

## 2022-05-12 DIAGNOSIS — Z12.5 SCREENING FOR PROSTATE CANCER: ICD-10-CM

## 2022-05-12 DIAGNOSIS — E78.5 HYPERLIPIDEMIA, UNSPECIFIED HYPERLIPIDEMIA TYPE: ICD-10-CM

## 2022-05-12 DIAGNOSIS — E11.9 TYPE 2 DIABETES MELLITUS WITHOUT COMPLICATION, WITHOUT LONG-TERM CURRENT USE OF INSULIN: ICD-10-CM

## 2022-05-12 DIAGNOSIS — E03.9 HYPOTHYROIDISM, UNSPECIFIED TYPE: ICD-10-CM

## 2022-05-12 DIAGNOSIS — I10 PRIMARY HYPERTENSION: ICD-10-CM

## 2022-05-12 LAB
ALBUMIN SERPL-MCNC: 4.6 G/DL (ref 3.5–5.2)
ALBUMIN/GLOB SERPL: 2 G/DL
ALP SERPL-CCNC: 50 U/L (ref 39–117)
ALT SERPL W P-5'-P-CCNC: 52 U/L (ref 1–41)
ANION GAP SERPL CALCULATED.3IONS-SCNC: 11.5 MMOL/L (ref 5–15)
AST SERPL-CCNC: 41 U/L (ref 1–40)
BILIRUB SERPL-MCNC: 0.7 MG/DL (ref 0–1.2)
BUN SERPL-MCNC: 12 MG/DL (ref 6–20)
BUN/CREAT SERPL: 12.6 (ref 7–25)
CALCIUM SPEC-SCNC: 9.3 MG/DL (ref 8.6–10.5)
CHLORIDE SERPL-SCNC: 102 MMOL/L (ref 98–107)
CHOLEST SERPL-MCNC: 197 MG/DL (ref 0–200)
CO2 SERPL-SCNC: 23.5 MMOL/L (ref 22–29)
CREAT SERPL-MCNC: 0.95 MG/DL (ref 0.76–1.27)
DEPRECATED RDW RBC AUTO: 39.8 FL (ref 37–54)
EGFRCR SERPLBLD CKD-EPI 2021: 97.5 ML/MIN/1.73
ERYTHROCYTE [DISTWIDTH] IN BLOOD BY AUTOMATED COUNT: 11.9 % (ref 12.3–15.4)
GLOBULIN UR ELPH-MCNC: 2.3 GM/DL
GLUCOSE SERPL-MCNC: 177 MG/DL (ref 65–99)
HBA1C MFR BLD: 8 % (ref 4.8–5.6)
HCT VFR BLD AUTO: 44.1 % (ref 37.5–51)
HDLC SERPL-MCNC: 36 MG/DL (ref 40–60)
HGB BLD-MCNC: 15.9 G/DL (ref 13–17.7)
LDLC SERPL CALC-MCNC: 109 MG/DL (ref 0–100)
LDLC/HDLC SERPL: 2.81 {RATIO}
MCH RBC QN AUTO: 33.1 PG (ref 26.6–33)
MCHC RBC AUTO-ENTMCNC: 36.1 G/DL (ref 31.5–35.7)
MCV RBC AUTO: 91.9 FL (ref 79–97)
PLATELET # BLD AUTO: 223 10*3/MM3 (ref 140–450)
PMV BLD AUTO: 11.6 FL (ref 6–12)
POTASSIUM SERPL-SCNC: 4.3 MMOL/L (ref 3.5–5.2)
PROT SERPL-MCNC: 6.9 G/DL (ref 6–8.5)
PSA SERPL-MCNC: 0.26 NG/ML (ref 0–4)
RBC # BLD AUTO: 4.8 10*6/MM3 (ref 4.14–5.8)
SODIUM SERPL-SCNC: 137 MMOL/L (ref 136–145)
T4 FREE SERPL-MCNC: 1.61 NG/DL (ref 0.93–1.7)
TRIGL SERPL-MCNC: 299 MG/DL (ref 0–150)
TSH SERPL DL<=0.05 MIU/L-ACNC: 1.95 UIU/ML (ref 0.27–4.2)
VLDLC SERPL-MCNC: 52 MG/DL (ref 5–40)
WBC NRBC COR # BLD: 3.51 10*3/MM3 (ref 3.4–10.8)

## 2022-05-12 PROCEDURE — 84439 ASSAY OF FREE THYROXINE: CPT

## 2022-05-12 PROCEDURE — G0103 PSA SCREENING: HCPCS

## 2022-05-12 PROCEDURE — 83036 HEMOGLOBIN GLYCOSYLATED A1C: CPT

## 2022-05-12 PROCEDURE — 80050 GENERAL HEALTH PANEL: CPT

## 2022-05-12 PROCEDURE — 36415 COLL VENOUS BLD VENIPUNCTURE: CPT

## 2022-05-12 PROCEDURE — 80061 LIPID PANEL: CPT

## 2022-05-23 ENCOUNTER — OFFICE VISIT (OUTPATIENT)
Dept: FAMILY MEDICINE CLINIC | Facility: CLINIC | Age: 52
End: 2022-05-23

## 2022-05-23 VITALS
DIASTOLIC BLOOD PRESSURE: 70 MMHG | WEIGHT: 216.6 LBS | OXYGEN SATURATION: 98 % | HEART RATE: 92 BPM | SYSTOLIC BLOOD PRESSURE: 126 MMHG | TEMPERATURE: 97.3 F | HEIGHT: 69 IN | BODY MASS INDEX: 32.08 KG/M2

## 2022-05-23 DIAGNOSIS — Z00.00 WELL ADULT EXAM: ICD-10-CM

## 2022-05-23 DIAGNOSIS — E78.5 HYPERLIPIDEMIA, UNSPECIFIED HYPERLIPIDEMIA TYPE: Primary | ICD-10-CM

## 2022-05-23 DIAGNOSIS — E11.65 TYPE 2 DIABETES MELLITUS WITH HYPERGLYCEMIA, WITHOUT LONG-TERM CURRENT USE OF INSULIN: ICD-10-CM

## 2022-05-23 PROCEDURE — 99396 PREV VISIT EST AGE 40-64: CPT | Performed by: NURSE PRACTITIONER

## 2022-05-23 RX ORDER — DAPAGLIFLOZIN AND METFORMIN HYDROCHLORIDE 10; 1000 MG/1; MG/1
1 TABLET, FILM COATED, EXTENDED RELEASE ORAL DAILY
Qty: 90 TABLET | Refills: 1 | Status: SHIPPED | OUTPATIENT
Start: 2022-05-23 | End: 2022-11-15 | Stop reason: SDUPTHER

## 2022-05-23 NOTE — PROGRESS NOTES
"Chief Complaint  Annual Exam    Subjective          Jaciel Shaw presents to [facility]  Patient presents to the office today for a wellness physical.  I did review recent labs with the patient including his A1c that had increased to 8%.  I did discuss adjusting his medications to get better control.  Patient states that he is eating healthy and exercising.  Denies any other concerns or complaints at this time.  Blood pressure is 126/70.  Denies any chest pain shortness breath palpitations time.      Objective   Vital Signs:  /70 (BP Location: Right arm, Patient Position: Sitting, Cuff Size: Adult)   Pulse 92   Temp 97.3 °F (36.3 °C) (Temporal)   Ht 175.3 cm (69\")   Wt 98.2 kg (216 lb 9.6 oz)   SpO2 98%   BMI 31.99 kg/m²   BMI is >= 30 and <= 34.9 (Class 1 obesity). The following options were offered after discussion: nutrition counseling/recommendations      Physical Exam  Vitals reviewed.   Constitutional:       Appearance: Normal appearance.   HENT:      Head: Normocephalic and atraumatic.      Right Ear: Tympanic membrane, ear canal and external ear normal.      Left Ear: Tympanic membrane, ear canal and external ear normal.      Mouth/Throat:      Mouth: Mucous membranes are moist.      Pharynx: Oropharynx is clear.   Eyes:      Extraocular Movements: Extraocular movements intact.      Conjunctiva/sclera: Conjunctivae normal.      Pupils: Pupils are equal, round, and reactive to light.   Cardiovascular:      Rate and Rhythm: Normal rate and regular rhythm.      Pulses: Normal pulses.      Heart sounds: Normal heart sounds, S1 normal and S2 normal. No murmur heard.  Pulmonary:      Effort: Pulmonary effort is normal. No respiratory distress.      Breath sounds: Normal breath sounds.   Abdominal:      General: Abdomen is flat. Bowel sounds are normal.      Palpations: Abdomen is soft.   Musculoskeletal:         General: Normal range of motion.   Skin:     General: " Skin is warm and dry.   Neurological:      Mental Status: He is alert and oriented to person, place, and time.   Psychiatric:         Attention and Perception: Attention normal.         Mood and Affect: Mood normal.         Behavior: Behavior normal.        Result Review :                Assessment and Plan    Diagnoses and all orders for this visit:    1. Hyperlipidemia, unspecified hyperlipidemia type (Primary)    2. Type 2 diabetes mellitus with hyperglycemia, without long-term current use of insulin (HCC)  -     Hemoglobin A1c; Future  -     dapagliflozin-metformin HCl ER (Xigduo XR)  MG tablet; Take 1 tablet by mouth Daily.  Dispense: 90 tablet; Refill: 1    3. Well adult exam    Preventative counseling includes healthy diet and exercise.         Follow Up   Return in about 6 months (around 11/23/2022) for Recheck.  Patient was given instructions and counseling regarding his condition or for health maintenance advice. Please see specific information pulled into the AVS if appropriate.

## 2022-08-17 ENCOUNTER — LAB (OUTPATIENT)
Dept: LAB | Facility: HOSPITAL | Age: 52
End: 2022-08-17

## 2022-08-17 DIAGNOSIS — E11.65 TYPE 2 DIABETES MELLITUS WITH HYPERGLYCEMIA, WITHOUT LONG-TERM CURRENT USE OF INSULIN: ICD-10-CM

## 2022-08-17 LAB — HBA1C MFR BLD: 7.6 % (ref 4.8–5.6)

## 2022-08-17 PROCEDURE — 36415 COLL VENOUS BLD VENIPUNCTURE: CPT

## 2022-08-17 PROCEDURE — 83036 HEMOGLOBIN GLYCOSYLATED A1C: CPT

## 2022-11-11 ENCOUNTER — LAB (OUTPATIENT)
Dept: LAB | Facility: HOSPITAL | Age: 52
End: 2022-11-11

## 2022-11-11 ENCOUNTER — TELEPHONE (OUTPATIENT)
Dept: FAMILY MEDICINE CLINIC | Facility: CLINIC | Age: 52
End: 2022-11-11

## 2022-11-11 DIAGNOSIS — E03.9 HYPOTHYROIDISM, UNSPECIFIED TYPE: ICD-10-CM

## 2022-11-11 DIAGNOSIS — E78.5 HYPERLIPIDEMIA, UNSPECIFIED HYPERLIPIDEMIA TYPE: Primary | ICD-10-CM

## 2022-11-11 DIAGNOSIS — E11.65 TYPE 2 DIABETES MELLITUS WITH HYPERGLYCEMIA, WITHOUT LONG-TERM CURRENT USE OF INSULIN: ICD-10-CM

## 2022-11-11 DIAGNOSIS — E78.5 HYPERLIPIDEMIA, UNSPECIFIED HYPERLIPIDEMIA TYPE: ICD-10-CM

## 2022-11-11 LAB
ALBUMIN SERPL-MCNC: 4.5 G/DL (ref 3.5–5.2)
ALBUMIN UR-MCNC: <1.2 MG/DL
ALBUMIN/GLOB SERPL: 1.7 G/DL
ALP SERPL-CCNC: 52 U/L (ref 39–117)
ALT SERPL W P-5'-P-CCNC: 36 U/L (ref 1–41)
ANION GAP SERPL CALCULATED.3IONS-SCNC: 12 MMOL/L (ref 5–15)
AST SERPL-CCNC: 31 U/L (ref 1–40)
BASOPHILS # BLD AUTO: 0.05 10*3/MM3 (ref 0–0.2)
BASOPHILS NFR BLD AUTO: 0.7 % (ref 0–1.5)
BILIRUB SERPL-MCNC: 0.6 MG/DL (ref 0–1.2)
BUN SERPL-MCNC: 14 MG/DL (ref 6–20)
BUN/CREAT SERPL: 13.3 (ref 7–25)
CALCIUM SPEC-SCNC: 9.4 MG/DL (ref 8.6–10.5)
CHLORIDE SERPL-SCNC: 100 MMOL/L (ref 98–107)
CHOLEST SERPL-MCNC: 186 MG/DL (ref 0–200)
CO2 SERPL-SCNC: 26 MMOL/L (ref 22–29)
CREAT SERPL-MCNC: 1.05 MG/DL (ref 0.76–1.27)
DEPRECATED RDW RBC AUTO: 41 FL (ref 37–54)
EGFRCR SERPLBLD CKD-EPI 2021: 86.5 ML/MIN/1.73
EOSINOPHIL # BLD AUTO: 0.11 10*3/MM3 (ref 0–0.4)
EOSINOPHIL NFR BLD AUTO: 1.4 % (ref 0.3–6.2)
ERYTHROCYTE [DISTWIDTH] IN BLOOD BY AUTOMATED COUNT: 11.9 % (ref 12.3–15.4)
GLOBULIN UR ELPH-MCNC: 2.6 GM/DL
GLUCOSE SERPL-MCNC: 153 MG/DL (ref 65–99)
HBA1C MFR BLD: 7.3 % (ref 4.8–5.6)
HCT VFR BLD AUTO: 46.4 % (ref 37.5–51)
HDLC SERPL-MCNC: 30 MG/DL (ref 40–60)
HGB BLD-MCNC: 16 G/DL (ref 13–17.7)
IMM GRANULOCYTES # BLD AUTO: 0.05 10*3/MM3 (ref 0–0.05)
IMM GRANULOCYTES NFR BLD AUTO: 0.7 % (ref 0–0.5)
LDLC SERPL CALC-MCNC: 110 MG/DL (ref 0–100)
LDLC/HDLC SERPL: 3.45 {RATIO}
LYMPHOCYTES # BLD AUTO: 2.03 10*3/MM3 (ref 0.7–3.1)
LYMPHOCYTES NFR BLD AUTO: 26.5 % (ref 19.6–45.3)
MCH RBC QN AUTO: 32.3 PG (ref 26.6–33)
MCHC RBC AUTO-ENTMCNC: 34.5 G/DL (ref 31.5–35.7)
MCV RBC AUTO: 93.7 FL (ref 79–97)
MONOCYTES # BLD AUTO: 0.59 10*3/MM3 (ref 0.1–0.9)
MONOCYTES NFR BLD AUTO: 7.7 % (ref 5–12)
NEUTROPHILS NFR BLD AUTO: 4.83 10*3/MM3 (ref 1.7–7)
NEUTROPHILS NFR BLD AUTO: 63 % (ref 42.7–76)
NRBC BLD AUTO-RTO: 0 /100 WBC (ref 0–0.2)
PLATELET # BLD AUTO: 271 10*3/MM3 (ref 140–450)
PMV BLD AUTO: 11.1 FL (ref 6–12)
POTASSIUM SERPL-SCNC: 4.1 MMOL/L (ref 3.5–5.2)
PROT SERPL-MCNC: 7.1 G/DL (ref 6–8.5)
RBC # BLD AUTO: 4.95 10*6/MM3 (ref 4.14–5.8)
SODIUM SERPL-SCNC: 138 MMOL/L (ref 136–145)
T4 FREE SERPL-MCNC: 1.77 NG/DL (ref 0.93–1.7)
TRIGL SERPL-MCNC: 263 MG/DL (ref 0–150)
TSH SERPL DL<=0.05 MIU/L-ACNC: 1.82 UIU/ML (ref 0.27–4.2)
VLDLC SERPL-MCNC: 46 MG/DL (ref 5–40)
WBC NRBC COR # BLD: 7.66 10*3/MM3 (ref 3.4–10.8)

## 2022-11-11 PROCEDURE — 83036 HEMOGLOBIN GLYCOSYLATED A1C: CPT

## 2022-11-11 PROCEDURE — 36415 COLL VENOUS BLD VENIPUNCTURE: CPT

## 2022-11-11 PROCEDURE — 82043 UR ALBUMIN QUANTITATIVE: CPT

## 2022-11-11 PROCEDURE — 84439 ASSAY OF FREE THYROXINE: CPT

## 2022-11-11 PROCEDURE — 80061 LIPID PANEL: CPT

## 2022-11-11 PROCEDURE — 80050 GENERAL HEALTH PANEL: CPT

## 2022-11-11 NOTE — TELEPHONE ENCOUNTER
----- Message from Jaciel Shaw sent at 11/11/2022  7:37 AM EST -----  Regarding: Labs  Contact: [phone]  I went to have my labs done this morning and there was not an order. Can you please send order so I can get done before my appointment on Tuesday? Thanks

## 2022-11-15 ENCOUNTER — OFFICE VISIT (OUTPATIENT)
Dept: FAMILY MEDICINE CLINIC | Facility: CLINIC | Age: 52
End: 2022-11-15

## 2022-11-15 VITALS
SYSTOLIC BLOOD PRESSURE: 122 MMHG | HEART RATE: 110 BPM | TEMPERATURE: 97.4 F | HEIGHT: 69 IN | WEIGHT: 211.2 LBS | BODY MASS INDEX: 31.28 KG/M2 | OXYGEN SATURATION: 98 % | DIASTOLIC BLOOD PRESSURE: 64 MMHG

## 2022-11-15 DIAGNOSIS — E03.9 HYPOTHYROIDISM, UNSPECIFIED TYPE: ICD-10-CM

## 2022-11-15 DIAGNOSIS — E78.5 HYPERLIPIDEMIA, UNSPECIFIED HYPERLIPIDEMIA TYPE: ICD-10-CM

## 2022-11-15 DIAGNOSIS — E11.65 TYPE 2 DIABETES MELLITUS WITH HYPERGLYCEMIA, WITHOUT LONG-TERM CURRENT USE OF INSULIN: ICD-10-CM

## 2022-11-15 DIAGNOSIS — Z12.11 SCREENING FOR COLON CANCER: Primary | ICD-10-CM

## 2022-11-15 PROCEDURE — 99214 OFFICE O/P EST MOD 30 MIN: CPT | Performed by: NURSE PRACTITIONER

## 2022-11-15 RX ORDER — LEVOTHYROXINE SODIUM 0.07 MG/1
75 TABLET ORAL DAILY
Qty: 90 TABLET | Refills: 1 | Status: SHIPPED | OUTPATIENT
Start: 2022-11-15

## 2022-11-15 RX ORDER — LISINOPRIL 5 MG/1
5 TABLET ORAL DAILY
Qty: 90 TABLET | Refills: 1 | Status: SHIPPED | OUTPATIENT
Start: 2022-11-15

## 2022-11-15 RX ORDER — DAPAGLIFLOZIN AND METFORMIN HYDROCHLORIDE 10; 1000 MG/1; MG/1
1 TABLET, FILM COATED, EXTENDED RELEASE ORAL DAILY
Qty: 90 TABLET | Refills: 1 | Status: SHIPPED | OUTPATIENT
Start: 2022-11-15

## 2022-11-15 NOTE — PROGRESS NOTES
"Chief Complaint  Hypertension (6 month follow up)    Subjective         Jaciel Shaw presents to [facility]  Presents to the office today for 6-month follow-up regarding his diabetes hypothyroidism and hyperlipidemia.  I did review recent lab work with the patient.  He does state that he is needing refills on all of his medications.  Patient does complain of left shoulder pain.  He states that he was trying to swat a fly at work and states that he pulled a muscle in his shoulder.  He states that he has been resting that and seems to be doing somewhat better.  Patient does state that he will continue to monitor this.  He denies wanting a muscle relaxer at this time.  I explained that if the shoulder did not improve after a few weeks to contact the office.   He denies any other concerns or complaints at this time    Hypertension         Objective     Vitals:    11/15/22 1541   BP: 122/64   BP Location: Right arm   Patient Position: Sitting   Cuff Size: Adult   Pulse: 110   Temp: 97.4 °F (36.3 °C)   TempSrc: Temporal   SpO2: 98%   Weight: 95.8 kg (211 lb 3.2 oz)   Height: 175.3 cm (69\")      Body mass index is 31.19 kg/m².    BMI is >= 30 and <35. (Class 1 Obesity). The following options were offered after discussion;: nutrition counseling/recommendations        Physical Exam  Vitals reviewed.   Constitutional:       Appearance: Normal appearance.   Cardiovascular:      Rate and Rhythm: Normal rate and regular rhythm.      Pulses: Normal pulses.      Heart sounds: Normal heart sounds, S1 normal and S2 normal. No murmur heard.  Pulmonary:      Effort: Pulmonary effort is normal. No respiratory distress.      Breath sounds: Normal breath sounds.   Musculoskeletal:      Left shoulder: Tenderness present. No swelling. Normal range of motion.   Skin:     General: Skin is warm and dry.   Neurological:      Mental Status: He is alert and oriented to person, place, and time.   Psychiatric: "         Attention and Perception: Attention normal.         Mood and Affect: Mood normal.         Behavior: Behavior normal.          Result Review :   The following data was reviewed by: BAILEY Whitaker on 11/15/2022:      Procedures    Assessment and Plan   Diagnoses and all orders for this visit:    1. Screening for colon cancer (Primary)  -     Ambulatory Referral For Screening Colonoscopy    2. Type 2 diabetes mellitus with hyperglycemia, without long-term current use of insulin (HCC)  -     CBC (No Diff); Future  -     Comprehensive Metabolic Panel; Future  -     Hemoglobin A1c; Future  -     dapagliflozin-metformin HCl ER (Xigduo XR)  MG tablet; Take 1 tablet by mouth Daily.  Dispense: 90 tablet; Refill: 1  -     lisinopril (PRINIVIL,ZESTRIL) 5 MG tablet; Take 1 tablet by mouth Daily.  Dispense: 90 tablet; Refill: 1    3. Hypothyroidism, unspecified type  -     Cancel: TSH; Future  -     TSH; Future  -     levothyroxine (SYNTHROID, LEVOTHROID) 75 MCG tablet; Take 1 tablet by mouth Daily.  Dispense: 90 tablet; Refill: 1    4. Hyperlipidemia, unspecified hyperlipidemia type  -     CBC (No Diff); Future  -     Comprehensive Metabolic Panel; Future  -     Lipid Panel; Future          Follow Up   Return in about 6 months (around 5/15/2023) for Recheck.  Patient was given instructions and counseling regarding his condition or for health maintenance advice. Please see specific information pulled into the AVS if appropriate.       Answers for HPI/ROS submitted by the patient on 11/11/2022  What is the primary reason for your visit?: Other  Please describe your symptoms.: Diabetes, routine check up  Have you had these symptoms before?: Yes  How long have you been having these symptoms?: Greater than 2 weeks  Please list any medications you are currently taking for this condition.: Xigduo, Lisinipril, Levythroxin

## 2022-12-05 DIAGNOSIS — M25.512 ACUTE PAIN OF LEFT SHOULDER: Primary | ICD-10-CM

## 2023-01-03 ENCOUNTER — HOSPITAL ENCOUNTER (OUTPATIENT)
Dept: GENERAL RADIOLOGY | Facility: HOSPITAL | Age: 53
Discharge: HOME | End: 2023-01-03
Admitting: NURSE PRACTITIONER
Payer: COMMERCIAL

## 2023-01-03 DIAGNOSIS — M25.512 ACUTE PAIN OF LEFT SHOULDER: ICD-10-CM

## 2023-01-03 PROCEDURE — 73030 X-RAY EXAM OF SHOULDER: CPT

## 2023-01-05 ENCOUNTER — TELEPHONE (OUTPATIENT)
Dept: FAMILY MEDICINE CLINIC | Facility: CLINIC | Age: 53
End: 2023-01-05
Payer: COMMERCIAL

## 2023-01-05 DIAGNOSIS — M25.512 ACUTE PAIN OF LEFT SHOULDER: Primary | ICD-10-CM

## 2023-01-05 NOTE — TELEPHONE ENCOUNTER
----- Message from Jaciel Shaw sent at 1/4/2023  5:25 PM EST -----  Regarding: Shoulder pain  Contact: [phone]  Yes, I need to get some relief as it feels like a nerve is being pinched every time I try reach over my head. Thanks

## 2023-01-11 ENCOUNTER — TREATMENT (OUTPATIENT)
Dept: PHYSICAL THERAPY | Facility: CLINIC | Age: 53
End: 2023-01-11
Payer: COMMERCIAL

## 2023-01-11 DIAGNOSIS — R29.898 WEAKNESS OF LEFT UPPER EXTREMITY: ICD-10-CM

## 2023-01-11 DIAGNOSIS — M25.512 ACUTE PAIN OF LEFT SHOULDER: Primary | ICD-10-CM

## 2023-01-11 DIAGNOSIS — M25.612 SHOULDER STIFFNESS, LEFT: ICD-10-CM

## 2023-01-11 PROCEDURE — 97110 THERAPEUTIC EXERCISES: CPT

## 2023-01-11 PROCEDURE — 97161 PT EVAL LOW COMPLEX 20 MIN: CPT

## 2023-01-11 NOTE — PROGRESS NOTES
Physical Therapy Initial Evaluation and Plan of Care      [place] [facility]: [address] [facility] [place] [address]      Patient: Jaciel Shaw   : 1970  Diagnosis/ICD-10 Code:  Acute pain of left shoulder [M25.512]  Referring practitioner: BAILEY Whitaker  Date of Initial Visit: 2023  Today's Date: 2023  Patient seen for 1 sessions           Subjective Questionnaire: QuickDASH: 29      Subjective   Pt reports to physical therapy w/ complaints of L shoulder pain. Pt reports at the end of October they were swatting at a bug with their L UE ( shoulder horiz ADD) and they felt a sharp pain. Pt reports this pain can travel down into their L 5th digit, but most of the time it is at their lateral aspect of their L shoulder. At times, numbness and tingling in the same distribution of the sharp pain. Pt reports their pain intensifies with lifting their shoulder up to the side (ABD). Pt has no pain at rest. Pt has tried to use TENS, ice, and heat to decrease pain while they are actively moving their shoulder, but this has not helped with the sharp pain.   Pt denies neck pain.     Pt is R handed.       Pt occupation:  at the hospital     Current Pain: 0/10  Best Pain: 0/10  Worst Pain: 10/10  Quality of pain: sharp    Aggravating Factors: Reaching over head, reaching behind back.   Easing Factors: resting on pillow, TENS, ice, heat    Past Medical Hx: DM, allergies. Pt reports they have had a L shoulder injury before 32 years ago. Pt reports their shoulder dislocated and relocated. Pt did not have this issue addressed medically at the time. 'Has always felt a little something'.     Xray L shoulder:     IMPRESSION:                 1. No radiographic findings of acute osseous shoulder abnormality.  2. Mild degenerative changes at the glenohumeral and acromioclavicular joints.  Electronically Signed and Approved By: CHUCK PATRICK MD on 2023 at 16:53         Objective           Static Posture     Comments  Pt has a tendency to hold their L shoulder elevated, in a guarded position.     Palpation   Left   Tenderness of the infraspinatus.     Tenderness     Additional Tenderness Details  Pt has no tenderness or familiar pain w/ PA of SPs of cervical spine. Pt has some pain, that is not familiar, with L unilateral PA of C6.     Pt has some increased tenderness, that is not familiar pain, w/ PA of L TP at T6. Pt has increased stiffness in their upper, middle, and lower thoracic spine w/ PA testing.     Neurological Testing     Additional Neurological Details  Pt has intact light touch sensation that is consistent w/ dermatomes C4-T2 bilaterally.     Active Range of Motion   Cervical/Thoracic Spine   Cervical    Flexion: WFL  Extension: WFL  Left lateral flexion: WFL  Right lateral flexion: WFL  Left rotation: WFL  Right rotation: WFL  Left Shoulder   Flexion: 162 degrees   Abduction: 100 degrees with pain    Right Shoulder   Flexion: WFL  Abduction: WFL    Additional Active Range of Motion Details  Pt has no pain w/ OP to all planes associated w/ ROM testing   Pt has familiar pain w/ ABD of their L shoulder.    Scapular Mobility   Left Shoulder     Scapular Mobility beyond 90° FF   Upward rotation: inadequate    Additional Scapular Mobility Details  Pt has decreased familiar pain w/ upward rotation assist of L scapula.     Strength/Myotome Testing     Left Shoulder     Planes of Motion   Flexion: 4+   Abduction: 4+   External rotation at 0°: 4   Internal rotation at 0°: 5     Isolated Muscles   Lower trapezius: 4   Middle trapezius: 4   Upper trapezius: 5     Right Shoulder     Planes of Motion   Flexion: 5   Abduction: 5   External rotation at 0°: 5   Internal rotation at 0°: 5     Isolated Muscles   Lower trapezius: 4+   Middle trapezius: 4+   Upper trapezius: 5     Left Elbow   Flexion: 5  Extension: 4+    Right Elbow   Flexion: 5  Extension: 5    Additional Strength Details  Pt has  slight pain w/ L shoulder ER and triceps ext. This has not been intense       See Exercise, Manual, and Modality Logs for complete treatment.       Assessment & Plan     Assessment  Impairments: abnormal coordination, abnormal muscle firing, abnormal or restricted ROM, activity intolerance, impaired physical strength, lacks appropriate home exercise program and pain with function  Functional Limitations: carrying objects, lifting, pushing, reaching behind back, reaching overhead and unable to perform repetitive tasks  Assessment details: Pt reports to physical therapy w/ complaints of L shoulder pain. Pt has pain w/ L shoulder abduction that is greater than 90 degrees. Pt's pain continues to increase as they further abduct their L shoulder. Pt does have some decreased familiar L shoulder pain w/ assisting upward scapular rotation on the L as they are performing abduction. Pt also presents w/ decreased strength, decreased L shoulder ROM, and decreased tolerance to repetitive and overhead activities while utilizing their L UE. Pt has self perceived deficits described by Lupe. Pt will benefit from skilled physical therapy to address their current impairments and limitations in order to improve upon their functional mobility, ability to perform ADLs such as dressing without pain, and to return to performing overhead and repetitive tasks without pain.   Prognosis: good    Goals  Plan Goals: SHOULDER  PROBLEMS:     1. The patient has limited ROM of the L shoulder.    LTG 1: 12 weeks:  The patient will demonstrate 165 degrees of L shoulder flexion, 160 degrees of shoulder abduction, 65 degrees of shoulder external rotation, and 65 degrees of shoulder internal rotation to allow the patient to perform ADLs such as dressing and overhead motions without limitation.     STATUS:  New   STG 1a: 6 weeks:  The patient will demonstrate 165 degrees of L shoulder flexion, 120 degrees of shoulder abduction, 60 degrees of shoulder  external rotation, and 60 degrees of shoulder internal rotation.    STATUS:  New   TREATMENT: Manual therapy, therapeutic exercise, home exercise instruction, and modalities as needed to include: electrical stimulation, moist heat, and ice.    2. The patient has limited strength of the L shoulder.   LTG 2: 12 weeks:  The patient will demonstrate 5 /5 strength for L middle and lower trapezius muscle testing in order to demonstrate improved shoulder stability and scapular rhythm.    STATUS:  New   STG 2a: 6 weeks:  The patient will demonstrate 4+ /5 strength for L middle and lower trapezius muscle testing.    STATUS:  New   STG2b:  6 weeks:  The patient will be independent with home exercises.     STATUS:  New   TREATMENT: Manual therapy, therapeutic exercise, home exercise instruction, and modalities as needed to include: electrical stimulation, moist heat, and ice.     3. The patient complains of pain to the L shoulder.   LTG 3: 12 weeks:  The patient will report a pain rating of 2 /10 or better in order to improve sleep quality and tolerance to performance of activities of daily living.    STATUS:  New   STG 3a: 6 weeks:  The patient will report a pain rating of 3 /10 or better.      STATUS:  New   TREATMENT: Manual therapy, therapeutic exercise, home exercise instruction, and modalities as needed to include: electrical stimulation, moist heat, and ice.    4. Carrying, Moving, and Handling Objects Functional Limitation     LTG 4: 12 weeks:  The patient will demonstrate 9 % limitation by achieving a score of 15 on the QuickDASH.    STATUS:  New   STG 4a: 6 weeks:  The patient will demonstrate 20 % limitation by achieving a score of 20 on the QuickDASH.      STATUS:  New   TREATMENT:  Manual therapy, therapeutic exercise, home exercise instruction, and modalities as needed to include: moist heat and electrical stimulation.           PLAN:  Therapy options: will receive skilled therapy services  Planned modality  interventions: Cryotherapy  Planned therapy interventions:ADL retraining, soft tissue mobilization, strengthening, stretching, therapeutic activities, manual therapy, joint mobilization, home exercise program/patient education, functional ROM exercises, flexibility, body mechanics training, postural training and neuromuscular re-education  Frequency: 2x per week  Duration in weeks: 12  Treatment plan discussed with: patient      Visit Diagnoses:    ICD-10-CM ICD-9-CM   1. Acute pain of left shoulder  M25.512 719.41   2. Weakness of left upper extremity  R29.898 729.89   3. Shoulder stiffness, left  M25.612 719.51       History # of Personal Factors and/or Comorbidities: LOW (0)  Examination of Body System(s): # of elements: LOW (1-2)  Clinical Presentation: STABLE   Clinical Decision Making: LOW       Timed:         Manual Therapy:    0     mins  45495;     Therapeutic Exercise:    25     mins  64956;     Neuromuscular Tabatha:    0    mins  56125;    Therapeutic Activity:     0     mins  18534;     Gait Trainin     mins  37529;     Ultrasound:     0     mins  93723;    Ionto                               0    mins   93790  Self Care                       0     mins   39655  Canalith Repos    0     mins 08405      Un-Timed:  Electrical Stimulation:    0     mins  36745 ( );  Dry Needling     0     mins self-pay  Traction     0     mins 23179  Low Eval     30     Mins  47302  Mod Eval     0     Mins  58827  High Eval                       0     Mins  11781  Re-Eval                           0    mins  10510    Timed Treatment:   25   mins   Total Treatment:     55   mins    PT SIGNATURE: MAGDA Ruby PTT    Electronically signed 2023    [place] License: PT - [id number]    Supervised by Raoul Chaidez PT, DPT [place] License PT - [id number]    Initial Certification  Certification Period: 2023 thru 4/10/2023  I certify that the therapy services are furnished while this patient is under my care.  The services  outlined above are required by this patient, and will be reviewed every 90 days.     PHYSICIAN: Ryan Ambrose APRN  NPI: [id number]      DATE:     Please sign and return via fax to [phone]. Thank you, [facility].

## 2023-01-16 ENCOUNTER — TREATMENT (OUTPATIENT)
Dept: PHYSICAL THERAPY | Facility: CLINIC | Age: 53
End: 2023-01-16
Payer: COMMERCIAL

## 2023-01-16 DIAGNOSIS — R29.898 WEAKNESS OF LEFT UPPER EXTREMITY: ICD-10-CM

## 2023-01-16 DIAGNOSIS — M25.612 SHOULDER STIFFNESS, LEFT: ICD-10-CM

## 2023-01-16 DIAGNOSIS — M25.512 ACUTE PAIN OF LEFT SHOULDER: Primary | ICD-10-CM

## 2023-01-16 PROCEDURE — 97140 MANUAL THERAPY 1/> REGIONS: CPT | Performed by: PHYSICAL THERAPIST

## 2023-01-16 PROCEDURE — 97530 THERAPEUTIC ACTIVITIES: CPT | Performed by: PHYSICAL THERAPIST

## 2023-01-16 PROCEDURE — 97110 THERAPEUTIC EXERCISES: CPT | Performed by: PHYSICAL THERAPIST

## 2023-01-16 NOTE — PROGRESS NOTES
Physical Therapy Daily Treatment Note  [place] [facility] [address] [place] [address]    Patient: Jaciel Shaw   : 1970  Referring practitioner: BAILEY Whitaker  Date of Initial Visit: Type: THERAPY  Noted: 2023  Today's Date: 2023  Patient seen for 2 sessions           Subjective  Jaciel Shaw reports: he is still experiencing pain at his L shoulder with overhead reaching.     Objective   During session, with Marta attending to scapular retraction/depression L and not allowing UT to activate, he experienced pain free shoulder AROM and demonstrated flexion past 180 degrees  B.    See Exercise, Manual, and Modality Logs for complete treatment.       Assessment/Plan  Marta recognized the Ktape helped him maintain improved alignment and reduce his shoulder c/o. This is just his first follow up after initial evaluation. He requires care as outlined in POC to attend to deficits defined.    Visit Diagnoses:    ICD-10-CM ICD-9-CM   1. Acute pain of left shoulder  M25.512 719.41   2. Weakness of left upper extremity  R29.898 729.89   3. Shoulder stiffness, left  M25.612 719.51       Progress per Plan of Care and Progress strengthening /stabilization /functional activity           Timed:  Manual Therapy:    23     mins  23548;  Therapeutic Exercise:    8     mins  36236;     Neuromuscular Tabatha:        mins  79961;    Therapeutic Activity:     10     mins  88074;     Gait Training:           mins  33039;     Ultrasound:          mins  88995;    Electrical Stimulation:         mins  09002 ( );  Aquatics  __   mins   09378    Untimed:  Electrical Stimulation:         mins  44870 ( );  Mechanical Traction:         mins  39749;     Timed Treatment:   41   mins   Total Treatment:     41   mins    Electronically Signed:  Nuvia Parr PTA  Physical Therapist Assistant    [place] PTA license [id number]

## 2023-01-18 ENCOUNTER — TREATMENT (OUTPATIENT)
Dept: PHYSICAL THERAPY | Facility: CLINIC | Age: 53
End: 2023-01-18
Payer: COMMERCIAL

## 2023-01-18 DIAGNOSIS — R29.898 WEAKNESS OF LEFT UPPER EXTREMITY: ICD-10-CM

## 2023-01-18 DIAGNOSIS — M25.612 SHOULDER STIFFNESS, LEFT: ICD-10-CM

## 2023-01-18 DIAGNOSIS — M25.512 ACUTE PAIN OF LEFT SHOULDER: Primary | ICD-10-CM

## 2023-01-18 PROCEDURE — 97530 THERAPEUTIC ACTIVITIES: CPT

## 2023-01-18 PROCEDURE — 97110 THERAPEUTIC EXERCISES: CPT

## 2023-01-18 NOTE — PROGRESS NOTES
Physical Therapy Daily Treatment Note  [place] [facility]: [address] [facility] [place] [address]      Patient: Jaciel Shaw   : 1970  Diagnosis/ICD-10 Code:  Acute pain of left shoulder [M25.512]  Referring practitioner: BAILEY Whitaker  Date of Initial Visit: Type: THERAPY  Noted: 2023  Today's Date: 2023  Patient seen for 3 sessions           Subjective   The patient reported they felt as though the taping helped their shoulder pain and posture throughout the day.     Objective   See Exercise, Manual, and Modality Logs for complete treatment.     Assessment/Plan  Pt has improvement in their shoulder ROM and scapular rhythm. Pt has decreased pain w/ L shoulder ABD post shoulder clocks. Patient will continue to benefit from skilled physical therapy to further address their deficits in strength and ROM in order to improve upon their functional mobility and to meet their personal goals.          Timed:  Manual Therapy:    0     mins  83001;  Therapeutic Exercise:    15     mins  77893;     Neuromuscular Tabatha:   0    mins  38255;    Therapeutic Activity:     15     mins  63421;     Gait Trainin     mins  95016;     Aquatics                         0      mins  72869    Un-timed:  Mechanical Traction      0     mins  29092  Electrical Stimulation:    0     mins  34739 ( );      Timed Treatment:   30   mins   Total Treatment:     30   mins    Jarrett Dill PT, DPT    Electronically signed 2023    [place] License: PT - [id number]     Supervised by Raoul Chaidez PT DPT [place] License PT - [id number]

## 2023-01-23 ENCOUNTER — TREATMENT (OUTPATIENT)
Dept: PHYSICAL THERAPY | Facility: CLINIC | Age: 53
End: 2023-01-23
Payer: COMMERCIAL

## 2023-01-23 DIAGNOSIS — R29.898 WEAKNESS OF LEFT UPPER EXTREMITY: ICD-10-CM

## 2023-01-23 DIAGNOSIS — M25.612 SHOULDER STIFFNESS, LEFT: ICD-10-CM

## 2023-01-23 DIAGNOSIS — M25.512 ACUTE PAIN OF LEFT SHOULDER: Primary | ICD-10-CM

## 2023-01-23 PROCEDURE — 97110 THERAPEUTIC EXERCISES: CPT

## 2023-01-23 PROCEDURE — 97530 THERAPEUTIC ACTIVITIES: CPT

## 2023-01-23 PROCEDURE — 97140 MANUAL THERAPY 1/> REGIONS: CPT

## 2023-01-23 NOTE — PROGRESS NOTES
"Physical Therapy Daily Treatment Note  [place] [facility] [address] [place] [address]    Patient: Jaciel Shaw   : 1970  Referring practitioner: BAILEY Whitaker  Date of Initial Visit: Type: THERAPY  Noted: 2023  Today's Date: 2023  Patient seen for 4 sessions           Subjective  Jaciel Shaw reports: pain 4-5/10. With Ktape in place, he commented that he was free of impingement pain, \"just felt the muscles working.\"       Objective   See Exercise, Manual, and Modality Logs for complete treatment.       Assessment/Plan  Marta is responding well to strengthening yet still has weakness allowing faulty postural deficits. Marta tolerated further advancement revealing improved gross strength. He does well with the Ktape. Today utilized more of a mechanical correction to allow GH approximation. Continue per POC to aid pain relief with improved strength.     Visit Diagnoses:    ICD-10-CM ICD-9-CM   1. Acute pain of left shoulder  M25.512 719.41   2. Weakness of left upper extremity  R29.898 729.89   3. Shoulder stiffness, left  M25.612 719.51       Progress per Plan of Care and Progress strengthening /stabilization /functional activity           Timed:  Manual Therapy:    8     mins  06264;  Therapeutic Exercise:    12     mins  79921;     Neuromuscular Tabatha:        mins  50920;    Therapeutic Activity:     11     mins  73327;     Gait Training:           mins  68506;     Ultrasound:          mins  83495;    Electrical Stimulation:         mins  03196 ( );  Aquatics  __   mins   80684    Untimed:  Electrical Stimulation:         mins  06663 ( );  Mechanical Traction:         mins  64066;     Timed Treatment:   31   mins   Total Treatment:     31   mins    Electronically Signed:  Nuvia Parr PTA  Physical Therapist Assistant    [place] license [id number]            "

## 2023-01-25 ENCOUNTER — TREATMENT (OUTPATIENT)
Dept: PHYSICAL THERAPY | Facility: CLINIC | Age: 53
End: 2023-01-25
Payer: COMMERCIAL

## 2023-01-25 DIAGNOSIS — R29.898 WEAKNESS OF LEFT UPPER EXTREMITY: ICD-10-CM

## 2023-01-25 DIAGNOSIS — M25.512 ACUTE PAIN OF LEFT SHOULDER: Primary | ICD-10-CM

## 2023-01-25 DIAGNOSIS — M25.612 SHOULDER STIFFNESS, LEFT: ICD-10-CM

## 2023-01-25 PROCEDURE — 97110 THERAPEUTIC EXERCISES: CPT

## 2023-01-25 PROCEDURE — 97530 THERAPEUTIC ACTIVITIES: CPT

## 2023-01-25 PROCEDURE — 97140 MANUAL THERAPY 1/> REGIONS: CPT

## 2023-01-25 NOTE — PROGRESS NOTES
Physical Therapy Daily Treatment Note  [place] [facility]: [address] [facility] [place] [address]      Patient: Jaciel Shaw   : 1970  Diagnosis/ICD-10 Code:  Acute pain of left shoulder [M25.512]  Referring practitioner: BAILEY Whitaker  Date of Initial Visit: Type: THERAPY  Noted: 2023  Today's Date: 2023  Patient seen for 5 sessions           Subjective   The patient reported they have hardly had any pain this week. Pt states they currently are having no pain in L shoulder today. Pt does have some muscle burning when they are performing their overhead exs at home, but perceive this as muscle working.     Objective   See Exercise, Manual, and Modality Logs for complete treatment.     Assessment/Plan  Pt tolerates treatment session well as they have no increased pain. Discussed w/ pt about discharge planning, as they continue to progress in therapy. Patient will continue to benefit from skilled physical therapy to further address their deficits in strength and ROM in order to improve upon their functional mobility and to meet their personal goals.          Timed:  Manual Therapy:    8     mins  53430;  Therapeutic Exercise:    13     mins  82975;     Neuromuscular Tabatha:   0    mins  98783;    Therapeutic Activity:     10     mins  04914;     Gait Trainin     mins  01613;     Aquatics                         0      mins  26301    Un-timed:  Mechanical Traction      0     mins  58694  Electrical Stimulation:    0     mins  82886 ( );      Timed Treatment:   31   mins   Total Treatment:     31   mins    Jarrett Dill PT, DPT    Electronically signed 2023    [place] License: PT - [id number]

## 2023-01-30 ENCOUNTER — TREATMENT (OUTPATIENT)
Dept: PHYSICAL THERAPY | Facility: CLINIC | Age: 53
End: 2023-01-30
Payer: COMMERCIAL

## 2023-01-30 DIAGNOSIS — M25.612 SHOULDER STIFFNESS, LEFT: ICD-10-CM

## 2023-01-30 DIAGNOSIS — M25.512 ACUTE PAIN OF LEFT SHOULDER: Primary | ICD-10-CM

## 2023-01-30 DIAGNOSIS — R29.898 WEAKNESS OF LEFT UPPER EXTREMITY: ICD-10-CM

## 2023-01-30 PROCEDURE — 97110 THERAPEUTIC EXERCISES: CPT

## 2023-01-30 PROCEDURE — 97140 MANUAL THERAPY 1/> REGIONS: CPT

## 2023-01-30 NOTE — PROGRESS NOTES
Physical Therapy Daily Treatment Note  [place] [facility] [address] [place] [address]    Patient: Jaciel Shaw   : 1970  Referring practitioner: BAILEY Whitaker  Date of Initial Visit: Type: THERAPY  Noted: 2023  Today's Date: 2023  Patient seen for 6 sessions           Subjective  Jaciel Shaw reports: he is still having the same pain at the shoulder, indicating anterolateral shoulder cap.        Objective   See Exercise, Manual, and Modality Logs for complete treatment.       Assessment/Plan  K tape utilized for attention to GH approximation with retraction L. This fully alleviated pain with active motions once in place. He is to continue with skilled care to further attend to remaining deficits.    Visit Diagnoses:    ICD-10-CM ICD-9-CM   1. Acute pain of left shoulder  M25.512 719.41   2. Weakness of left upper extremity  R29.898 729.89   3. Shoulder stiffness, left  M25.612 719.51       Progress per Plan of Care and Progress strengthening /stabilization /functional activity           Timed:  Manual Therapy:    24     mins  18777;  Therapeutic Exercise:    10     mins  04531;     Neuromuscular Tabatha:        mins  34124;    Therapeutic Activity:          mins  48340;     Gait Training:           mins  03148;     Ultrasound:          mins  33568;    Electrical Stimulation:         mins  55081 ( );  Aquatics  __   mins   13251    Untimed:  Electrical Stimulation:         mins  43711 ( );  Mechanical Traction:         mins  15799;     Timed Treatment:   34   mins   Total Treatment:     34   mins    Electronically Signed:  Nuvia Parr PTA  Physical Therapist Assistant    [place] [facility] license [id number]

## 2023-02-01 ENCOUNTER — TREATMENT (OUTPATIENT)
Dept: PHYSICAL THERAPY | Facility: CLINIC | Age: 53
End: 2023-02-01
Payer: COMMERCIAL

## 2023-02-01 DIAGNOSIS — R29.898 WEAKNESS OF LEFT UPPER EXTREMITY: ICD-10-CM

## 2023-02-01 DIAGNOSIS — M25.612 SHOULDER STIFFNESS, LEFT: ICD-10-CM

## 2023-02-01 DIAGNOSIS — M25.512 ACUTE PAIN OF LEFT SHOULDER: Primary | ICD-10-CM

## 2023-02-01 PROCEDURE — 97110 THERAPEUTIC EXERCISES: CPT

## 2023-02-01 PROCEDURE — 97530 THERAPEUTIC ACTIVITIES: CPT

## 2023-02-01 NOTE — PROGRESS NOTES
Physical Therapy Daily Treatment Note  [place] [facility]: [address] [facility] [place] [address]      Patient: Jaciel Shaw   : 1970  Diagnosis/ICD-10 Code:  Acute pain of left shoulder [M25.512]  Referring practitioner: BAILEY Wihtaker  Date of Initial Visit: Type: THERAPY  Noted: 2023  Today's Date: 2023  Patient seen for 7 sessions           Subjective   The patient reported their L shoulder is feeling better in comparison to the other day. Pt reports the tape did help. Pt states their shoulder is up to a 4/10 when they reach overhead and then out to the L. This is better than when they started therapy in their perception.     Objective   See Exercise, Manual, and Modality Logs for complete treatment.     Assessment/Plan  Pt tolerates treatment session well today as they have no increase in pain and feel as though their muscle is worked. Pt still has pain when they are in 90 deg of ABD and have to horizontally ABD past midline. Patient will continue to benefit from skilled physical therapy to further address their deficits in strength and ROM in order to improve upon their functional mobility and to meet their personal goals.          Timed:  Manual Therapy:    0     mins  66418;  Therapeutic Exercise:    9     mins  76545;     Neuromuscular Tabatha:   0    mins  74261;    Therapeutic Activity:     23     mins  90542;     Gait Trainin     mins  52495;     Aquatics                         0      mins  93712    Un-timed:  Mechanical Traction      0     mins  42328  Electrical Stimulation:    0     mins  59860 ( );      Timed Treatment:   32   mins   Total Treatment:     32   mins    Jarrett Dill PT, DPT    Electronically signed 2023    [place] License: PT - [id number]

## 2023-02-06 ENCOUNTER — TREATMENT (OUTPATIENT)
Dept: PHYSICAL THERAPY | Facility: CLINIC | Age: 53
End: 2023-02-06
Payer: COMMERCIAL

## 2023-02-06 DIAGNOSIS — M25.512 ACUTE PAIN OF LEFT SHOULDER: Primary | ICD-10-CM

## 2023-02-06 DIAGNOSIS — R29.898 WEAKNESS OF LEFT UPPER EXTREMITY: ICD-10-CM

## 2023-02-06 DIAGNOSIS — M25.612 SHOULDER STIFFNESS, LEFT: ICD-10-CM

## 2023-02-06 PROCEDURE — 97110 THERAPEUTIC EXERCISES: CPT | Performed by: PHYSICAL THERAPIST

## 2023-02-06 PROCEDURE — 97530 THERAPEUTIC ACTIVITIES: CPT | Performed by: PHYSICAL THERAPIST

## 2023-02-06 NOTE — PROGRESS NOTES
"Physical Therapy Daily Treatment Note  [place] [facility] [address] [place] [address]    Patient: Jaciel Shaw   : 1970  Referring practitioner: BAILEY Whitaker  Date of Initial Visit: Type: THERAPY  Noted: 2023  Today's Date: 2023  Patient seen for 7 sessions           Subjective  Jaciel Colin reports: he experienced \"itching from the tape, got it off.  Friday.\" Marta denied c/o today at arrival for therapy.    Objective   See Exercise, Manual, and Modality Logs for complete treatment.       Assessment/Plan  Marta tolerated advancement in resistance and repetitions during his program. No c/o after session except for muscle fatigue. Continue per POC to further advance his recovery of pain free ROM and strength.    Visit Diagnoses:    ICD-10-CM ICD-9-CM   1. Acute pain of left shoulder  M25.512 719.41   2. Weakness of left upper extremity  R29.898 729.89   3. Shoulder stiffness, left  M25.612 719.51       Progress per Plan of Care and Progress strengthening /stabilization /functional activity           Timed:  Manual Therapy:         mins  54574;  Therapeutic Exercise:    24     mins  02774;     Neuromuscular Tabatha:        mins  40312;    Therapeutic Activity:     10     mins  91451;     Gait Training:           mins  17174;     Ultrasound:          mins  44409;    Electrical Stimulation:         mins  43056 ( );  Aquatics  __   mins   80973    Untimed:  Electrical Stimulation:         mins  82676 ( );  Mechanical Traction:         mins  64509;     Timed Treatment:   34   mins   Total Treatment:     34   mins    Electronically Signed:  Nuvia Parr PTA  Physical Therapist Assistant    [place] PTA license [id number]            "

## 2023-02-08 ENCOUNTER — TREATMENT (OUTPATIENT)
Dept: PHYSICAL THERAPY | Facility: CLINIC | Age: 53
End: 2023-02-08
Payer: COMMERCIAL

## 2023-02-08 DIAGNOSIS — R29.898 WEAKNESS OF LEFT UPPER EXTREMITY: ICD-10-CM

## 2023-02-08 DIAGNOSIS — M25.512 ACUTE PAIN OF LEFT SHOULDER: Primary | ICD-10-CM

## 2023-02-08 DIAGNOSIS — M25.612 SHOULDER STIFFNESS, LEFT: ICD-10-CM

## 2023-02-08 PROCEDURE — 97140 MANUAL THERAPY 1/> REGIONS: CPT | Performed by: PHYSICAL THERAPIST

## 2023-02-08 PROCEDURE — 97110 THERAPEUTIC EXERCISES: CPT | Performed by: PHYSICAL THERAPIST

## 2023-02-08 PROCEDURE — 97530 THERAPEUTIC ACTIVITIES: CPT | Performed by: PHYSICAL THERAPIST

## 2023-02-08 NOTE — PROGRESS NOTES
Outpatient Physical Therapy  [address] [facility], [place] [address]                 Physical Therapy Daily Treatment Note    Patient: Jaciel Shaw   : 1970  Diagnosis/ICD-10 Code:  Acute pain of left shoulder [M25.512]  Referring practitioner: BAILEY Whitaker  Date of Initial Visit: Type: THERAPY  Noted: 2023  Today's Date: 2023  Patient seen for 8 sessions             Subjective   Jaciel Shaw reports: he is sore from last visit. Soreness is located in the front of his shoulder. Patient reports tape that was applied last visit irritated his skin and had to be taken off.     Pain: 0/10 pain in left shoulder at time of arrival     Objective   No complaints of increased pain or discomfort.     See Exercise, Manual, and Modality Logs for complete treatment.     Assessment/Plan  Pt tolerated exercises well, no complaints of increased pain or discomfort. Pt would benefit from skilled PT to address Range of Motion  and Strength deficits, pain management and any concerns with ADLs.     Progress per Plan of Care       Timed:  Manual Therapy:    10     mins  93020;  Therapeutic Exercise:    12     mins  52290;     Neuromuscular Tabatha:    0    mins  93427;    Therapeutic Activity:     8     mins  61820;     Gait Trainin     mins  80295;    Aquatic Therapy:     0     mins  77152;       Untimed:  Electrical Stimulation:    0     mins  45511 ( );  Mechanical Traction:    0     mins  35169;       Timed Treatment:   30   mins   Total Treatment:     30   mins      Electronically signed:   Carmen Horne PTA  Physical Therapist Assistant  [facility] License #: [id number]

## 2023-02-09 ENCOUNTER — PREP FOR SURGERY (OUTPATIENT)
Dept: OTHER | Facility: HOSPITAL | Age: 53
End: 2023-02-09
Payer: COMMERCIAL

## 2023-02-09 ENCOUNTER — OFFICE VISIT (OUTPATIENT)
Dept: SURGERY | Facility: CLINIC | Age: 53
End: 2023-02-09
Payer: COMMERCIAL

## 2023-02-09 VITALS — HEART RATE: 110 BPM | WEIGHT: 210 LBS | HEIGHT: 69 IN | BODY MASS INDEX: 31.1 KG/M2

## 2023-02-09 DIAGNOSIS — Z12.11 SCREENING FOR MALIGNANT NEOPLASM OF COLON: Primary | ICD-10-CM

## 2023-02-09 PROCEDURE — S0285 CNSLT BEFORE SCREEN COLONOSC: HCPCS | Performed by: NURSE PRACTITIONER

## 2023-02-09 RX ORDER — SOD SULF/POT CHLORIDE/MAG SULF 1.479 G
24 TABLET ORAL ONCE
Qty: 24 TABLET | Refills: 0 | Status: SHIPPED | OUTPATIENT
Start: 2023-02-09 | End: 2023-02-15

## 2023-02-10 NOTE — PROGRESS NOTES
Chief Complaint: Colonoscopy (consult)    Subjective      Colonoscopy consultation       History of Present Illness  Jaciel Shaw is a 52 y.o. male presents to [facility] for colonoscopy consultation.    Patient presents today on referral from Ryan Teresa for colonoscopy consultation.  Patient denies any abdominal pain, change in bowel habit, or rectal bleeding.    Denies any family history colorectal cancer.    No previous colonoscopy.    Objective     Past Medical History:   Diagnosis Date   • Allergic 3/88   • Displaced fracture of neck of fifth metacarpal bone, right hand, initial encounter for closed fracture 11/13/2015   • DM2 (diabetes mellitus, type 2) (HCC) 04/26/2016    Diet controlled   • HLD (hyperlipidemia) 04/28/2016   • Hypothyroidism 10/27/2017   • Seasonal allergies        Past Surgical History:   Procedure Laterality Date   • EYE SURGERY  1987    Detached retina   • SINUS SURGERY  9/20       Outpatient Medications Marked as Taking for the 2/9/23 encounter (Office Visit) with Dejan April, APRN   Medication Sig Dispense Refill   • dapagliflozin-metformin HCl ER (Xigduo XR)  MG tablet Take 1 tablet by mouth Daily. 90 tablet 1   • glucose blood (FREESTYLE LITE) test strip Use to test blood sugar once daily. (Patient taking differently: Use to test blood sugar once daily.) 100 each 3   • levothyroxine (SYNTHROID, LEVOTHROID) 75 MCG tablet Take 1 tablet by mouth Daily. 90 tablet 1   • lisinopril (PRINIVIL,ZESTRIL) 5 MG tablet Take 1 tablet by mouth Daily. 90 tablet 1   • loratadine (CLARITIN) 10 MG tablet Take 1 tablet by mouth Daily. 90 tablet 3       Allergies   Allergen Reactions   • Cephalexin Hives        Family History   Problem Relation Age of Onset   • Heart disease Mother    • Heart disease Father    • Heart disease Paternal Grandfather    • Heart disease Maternal Uncle    • Heart disease Maternal Aunt        Social History     Socioeconomic  "History   • Marital status:    Tobacco Use   • Smoking status: Former     Packs/day: 1.00     Years: 20.00     Pack years: 20.00     Types: Cigarettes     Start date: 1994     Quit date: 9/15/2014     Years since quittin.4   • Smokeless tobacco: Never   Vaping Use   • Vaping Use: Never used   Substance and Sexual Activity   • Alcohol use: Yes     Alcohol/week: 2.0 standard drinks     Types: 2 Cans of beer per week   • Drug use: Never   • Sexual activity: Yes     Partners: Female     Birth control/protection: None       Review of Systems   Constitutional: Negative for chills and fever.   Gastrointestinal: Negative for abdominal distention, abdominal pain, anal bleeding, blood in stool, constipation, diarrhea and rectal pain.        Vital Signs:   Pulse 110   Ht 175.3 cm (69\")   Wt 95.3 kg (210 lb)   BMI 31.01 kg/m²      Physical Exam  Vitals and nursing note reviewed.   Constitutional:       General: He is not in acute distress.     Appearance: Normal appearance.   HENT:      Head: Normocephalic.   Cardiovascular:      Rate and Rhythm: Normal rate.   Pulmonary:      Effort: Pulmonary effort is normal.      Breath sounds: No stridor.   Abdominal:      Palpations: Abdomen is soft.      Tenderness: There is no guarding.   Musculoskeletal:         General: No deformity. Normal range of motion.   Skin:     General: Skin is warm.      Coloration: Skin is not jaundiced.   Neurological:      General: No focal deficit present.      Mental Status: He is alert and oriented to person, place, and time.   Psychiatric:         Mood and Affect: Mood normal.         Thought Content: Thought content normal.          Result Review :          []  Laboratory  []  Radiology  []  Pathology  []  Microbiology  []  EKG/Telemetry   []  Cardiology/Vascular   [x]  Old records  Today I reviewed Ryan Teresa's previous office note.     Assessment and Plan    Diagnoses and all orders for this visit:    1. Screening for " malignant neoplasm of colon (Primary)    Other orders  -     Sodium Sulfate-Mag Sulfate-KCl (Sutab) 4797-848-312 MG tablet; Take 24 tablets by mouth 1 (One) Time for 1 dose. Take as directed. Directions given in office  Dispense: 24 tablet; Refill: 0        Follow Up   Return for Schedule colonoscopy with Dr. Bowers on 5/26/2023 [facility].     Phone PAT.  Hospital call with arrival time.    Possible risks/complications, benefits, and alternatives to surgical or invasive procedure have been explained to patient and/or legal guardian.     Patient has been evaluated and can tolerate anesthesia and/or sedation. Risks, benefits, and alternatives to anesthesia and sedation have been explained to patient and/or legal guardian.  Patient verbalizes understanding and is willing to proceed with above plan.    Patient was given instructions and counseling regarding his condition or for health maintenance advice. Please see specific information pulled into the AVS if appropriate.     The office note was dictated with the help of the dragon dictation system.

## 2023-02-13 ENCOUNTER — TREATMENT (OUTPATIENT)
Dept: PHYSICAL THERAPY | Facility: CLINIC | Age: 53
End: 2023-02-13
Payer: COMMERCIAL

## 2023-02-13 DIAGNOSIS — M25.512 ACUTE PAIN OF LEFT SHOULDER: Primary | ICD-10-CM

## 2023-02-13 DIAGNOSIS — M25.612 SHOULDER STIFFNESS, LEFT: ICD-10-CM

## 2023-02-13 DIAGNOSIS — R29.898 WEAKNESS OF LEFT UPPER EXTREMITY: ICD-10-CM

## 2023-02-13 PROCEDURE — 97530 THERAPEUTIC ACTIVITIES: CPT

## 2023-02-13 PROCEDURE — 97110 THERAPEUTIC EXERCISES: CPT

## 2023-02-13 NOTE — PROGRESS NOTES
Progress Note / Discharge Summary   [place] [facility]: [address] [facility] [place] [address]      Patient: Jaciel Shaw   : 1970  Diagnosis/ICD-10 Code:  Acute pain of left shoulder [M25.512]  Referring practitioner: BAILEY Whitaker  Date of Initial Visit: Type: THERAPY  Noted: 2023  Today's Date: 2023  Patient seen for 10 sessions      Subjective:   Subjective Questionnaire: QuickDASH: 15  Clinical Progress: improved  Home Program Compliance: Yes  Treatment has included: ADL retraining, soft tissue mobilization, strengthening, stretching, therapeutic activities, manual therapy, joint mobilization, home exercise program/patient education, functional ROM exercises, flexibility, body mechanics training, postural training and neuromuscular re-education    Subjective   Pt reports their L shoulder has been feeling fine recently and feels good today. Pt states they feel as though they can continue to self progress at this point w/ their HEP. Pt states their wife reminds them to do their HEP.       Objective     Active Range of Motion   Left Shoulder   Flexion: 162 degrees   Abduction: 160 degrees with pain    Strength/Myotome Testing     Left Shoulder      Planes of Motion   Flexion: 5  Abduction: 5  External rotation at 0°: 5   Internal rotation at 0°: 5      Isolated Muscles   Lower trapezius: 4+   Middle trapezius: 4 +  Upper trapezius: 5     Right Shoulder      Planes of Motion   Flexion: 5   Abduction: 5   External rotation at 0°: 5   Internal rotation at 0°: 5      Isolated Muscles   Lower trapezius: 4+   Middle trapezius: 4+   Upper trapezius: 5      Left Elbow   Flexion: 5  Extension: 5     Right Elbow   Flexion: 5  Extension: 5    See Exercise, Manual, and Modality Logs for complete treatment.     Assessment/Plan   Pt reported to physical therapy w/ complaints of L shoulder pain. Today, pt has decreased complaints of shoulder pain and improvement in their ROM and strength. Pt has been  independent w/ their HEP and is able to use it to manage their symptoms. Pt also has decreased perceived deficits described by Lupe. Pt's goals will be addressed at this time. Pt will be discharged from physical therapy w/ their current HEP.         Goals  Plan Goals: SHOULDER  PROBLEMS:     1. The patient has limited ROM of the L shoulder.                   LTG 1: 12 weeks:  The patient will demonstrate 165 degrees of L shoulder flexion, 160 degrees of shoulder abduction, 65 degrees of shoulder external rotation, and 65 degrees of shoulder internal rotation to allow the patient to perform ADLs such as dressing and overhead motions without limitation.                           STATUS:  met              STG 1a: 6 weeks:  The patient will demonstrate 165 degrees of L shoulder flexion, 120 degrees of shoulder abduction, 60 degrees of shoulder external rotation, and 60 degrees of shoulder internal rotation.                          STATUS:  met              TREATMENT: Manual therapy, therapeutic exercise, home exercise instruction, and modalities as needed to include: electrical stimulation, moist heat, and ice.    2. The patient has limited strength of the L shoulder.              LTG 2: 12 weeks:  The patient will demonstrate 5 /5 strength for L middle and lower trapezius muscle testing in order to demonstrate improved shoulder stability and scapular rhythm.                          STATUS:  not met              STG 2a: 6 weeks:  The patient will demonstrate 4+ /5 strength for L middle and lower trapezius muscle testing.                          STATUS:  met              STG2b:  6 weeks:  The patient will be independent with home exercises.                           STATUS:  met              TREATMENT: Manual therapy, therapeutic exercise, home exercise instruction, and modalities as needed to include: electrical stimulation, moist heat, and ice.                3. The patient complains of pain to the L  shoulder.              LTG 3: 12 weeks:  The patient will report a pain rating of 2 /10 or better in order to improve sleep quality and tolerance to performance of activities of daily living.                          STATUS:  met              STG 3a: 6 weeks:  The patient will report a pain rating of 3 /10 or better.                            STATUS:  met              TREATMENT: Manual therapy, therapeutic exercise, home exercise instruction, and modalities as needed to include: electrical stimulation, moist heat, and ice.    4. Carrying, Moving, and Handling Objects Functional Limitation                               LTG 4: 12 weeks:  The patient will demonstrate 9 % limitation by achieving a score of 15 on the QuickDASH.                          STATUS:  met              STG 4a: 6 weeks:  The patient will demonstrate 20 % limitation by achieving a score of 20 on the QuickDASH.                            STATUS:  met              TREATMENT:  Manual therapy, therapeutic exercise, home exercise instruction, and modalities as needed to include: moist heat and electrical stimulation.     Progress toward previous goals: Partially Met      Recommendations: Discharge      PT Signature: Jarrett Dill PT, DPT    Electronically signed 2023    [place] License: PT - [id number]         Timed:  Manual Therapy:    0     mins  87246;  Therapeutic Exercise:    15     mins  56101;     Neuromuscular Tabatha:    0    mins  93955;    Therapeutic Activity:     10     mins  61477;     Gait Trainin     mins  06230;     Aquatics                         0      mins  18732    Un-timed:  Mechanical Traction      0     mins  34797  Dry Needling     0     mins self-pay  Electrical Stimulation:    0     mins  66725 ( );    Timed Treatment:   25   mins   Total Treatment:     30   mins

## 2023-05-11 ENCOUNTER — LAB (OUTPATIENT)
Dept: LAB | Facility: HOSPITAL | Age: 53
End: 2023-05-11
Payer: COMMERCIAL

## 2023-05-11 DIAGNOSIS — E03.9 HYPOTHYROIDISM, UNSPECIFIED TYPE: ICD-10-CM

## 2023-05-11 DIAGNOSIS — E78.5 HYPERLIPIDEMIA, UNSPECIFIED HYPERLIPIDEMIA TYPE: ICD-10-CM

## 2023-05-11 DIAGNOSIS — E11.65 TYPE 2 DIABETES MELLITUS WITH HYPERGLYCEMIA, WITHOUT LONG-TERM CURRENT USE OF INSULIN: ICD-10-CM

## 2023-05-11 LAB
ALBUMIN SERPL-MCNC: 4.7 G/DL (ref 3.5–5.2)
ALBUMIN/GLOB SERPL: 2 G/DL
ALP SERPL-CCNC: 47 U/L (ref 39–117)
ALT SERPL W P-5'-P-CCNC: 48 U/L (ref 1–41)
ANION GAP SERPL CALCULATED.3IONS-SCNC: 10 MMOL/L (ref 5–15)
AST SERPL-CCNC: 40 U/L (ref 1–40)
BILIRUB SERPL-MCNC: 0.5 MG/DL (ref 0–1.2)
BUN SERPL-MCNC: 12 MG/DL (ref 6–20)
BUN/CREAT SERPL: 11.4 (ref 7–25)
CALCIUM SPEC-SCNC: 8.9 MG/DL (ref 8.6–10.5)
CHLORIDE SERPL-SCNC: 106 MMOL/L (ref 98–107)
CHOLEST SERPL-MCNC: 183 MG/DL (ref 0–200)
CO2 SERPL-SCNC: 25 MMOL/L (ref 22–29)
CREAT SERPL-MCNC: 1.05 MG/DL (ref 0.76–1.27)
DEPRECATED RDW RBC AUTO: 40.9 FL (ref 37–54)
EGFRCR SERPLBLD CKD-EPI 2021: 85.9 ML/MIN/1.73
ERYTHROCYTE [DISTWIDTH] IN BLOOD BY AUTOMATED COUNT: 12 % (ref 12.3–15.4)
GLOBULIN UR ELPH-MCNC: 2.3 GM/DL
GLUCOSE SERPL-MCNC: 172 MG/DL (ref 65–99)
HBA1C MFR BLD: 7.8 % (ref 4.8–5.6)
HCT VFR BLD AUTO: 47.2 % (ref 37.5–51)
HDLC SERPL-MCNC: 32 MG/DL (ref 40–60)
HGB BLD-MCNC: 16.1 G/DL (ref 13–17.7)
LDLC SERPL CALC-MCNC: 96 MG/DL (ref 0–100)
LDLC/HDLC SERPL: 2.68 {RATIO}
MCH RBC QN AUTO: 31.6 PG (ref 26.6–33)
MCHC RBC AUTO-ENTMCNC: 34.1 G/DL (ref 31.5–35.7)
MCV RBC AUTO: 92.5 FL (ref 79–97)
PLATELET # BLD AUTO: 202 10*3/MM3 (ref 140–450)
PMV BLD AUTO: 11.8 FL (ref 6–12)
POTASSIUM SERPL-SCNC: 4.4 MMOL/L (ref 3.5–5.2)
PROT SERPL-MCNC: 7 G/DL (ref 6–8.5)
RBC # BLD AUTO: 5.1 10*6/MM3 (ref 4.14–5.8)
SODIUM SERPL-SCNC: 141 MMOL/L (ref 136–145)
TRIGL SERPL-MCNC: 327 MG/DL (ref 0–150)
TSH SERPL DL<=0.05 MIU/L-ACNC: 1.24 UIU/ML (ref 0.27–4.2)
VLDLC SERPL-MCNC: 55 MG/DL (ref 5–40)
WBC NRBC COR # BLD: 7.51 10*3/MM3 (ref 3.4–10.8)

## 2023-05-11 PROCEDURE — 83036 HEMOGLOBIN GLYCOSYLATED A1C: CPT

## 2023-05-11 PROCEDURE — 80050 GENERAL HEALTH PANEL: CPT

## 2023-05-11 PROCEDURE — 36415 COLL VENOUS BLD VENIPUNCTURE: CPT

## 2023-05-11 PROCEDURE — 80061 LIPID PANEL: CPT

## 2023-05-16 ENCOUNTER — OFFICE VISIT (OUTPATIENT)
Dept: FAMILY MEDICINE CLINIC | Facility: CLINIC | Age: 53
End: 2023-05-16
Payer: COMMERCIAL

## 2023-05-16 VITALS
BODY MASS INDEX: 30.84 KG/M2 | OXYGEN SATURATION: 96 % | TEMPERATURE: 96.5 F | SYSTOLIC BLOOD PRESSURE: 120 MMHG | WEIGHT: 208.2 LBS | HEIGHT: 69 IN | HEART RATE: 106 BPM | DIASTOLIC BLOOD PRESSURE: 68 MMHG

## 2023-05-16 DIAGNOSIS — G89.29 CHRONIC LEFT SHOULDER PAIN: ICD-10-CM

## 2023-05-16 DIAGNOSIS — I10 PRIMARY HYPERTENSION: ICD-10-CM

## 2023-05-16 DIAGNOSIS — M25.512 CHRONIC LEFT SHOULDER PAIN: ICD-10-CM

## 2023-05-16 DIAGNOSIS — E11.65 TYPE 2 DIABETES MELLITUS WITH HYPERGLYCEMIA, WITHOUT LONG-TERM CURRENT USE OF INSULIN: Primary | ICD-10-CM

## 2023-05-16 DIAGNOSIS — E03.9 HYPOTHYROIDISM, UNSPECIFIED TYPE: ICD-10-CM

## 2023-05-16 DIAGNOSIS — Z87.891 HISTORY OF TOBACCO ABUSE: ICD-10-CM

## 2023-05-16 DIAGNOSIS — E78.5 HYPERLIPIDEMIA, UNSPECIFIED HYPERLIPIDEMIA TYPE: ICD-10-CM

## 2023-05-16 DIAGNOSIS — Z12.5 SCREENING FOR PROSTATE CANCER: ICD-10-CM

## 2023-05-16 PROCEDURE — 99214 OFFICE O/P EST MOD 30 MIN: CPT | Performed by: NURSE PRACTITIONER

## 2023-05-16 RX ORDER — TIRZEPATIDE 2.5 MG/.5ML
2.5 INJECTION, SOLUTION SUBCUTANEOUS WEEKLY
Qty: 2 ML | Refills: 0 | COMMUNITY
Start: 2023-05-16

## 2023-05-16 RX ORDER — LISINOPRIL 5 MG/1
5 TABLET ORAL DAILY
Qty: 90 TABLET | Refills: 1 | Status: SHIPPED | OUTPATIENT
Start: 2023-05-16

## 2023-05-16 RX ORDER — TIRZEPATIDE 5 MG/.5ML
5 INJECTION, SOLUTION SUBCUTANEOUS WEEKLY
Qty: 2 ML | Refills: 0 | Status: SHIPPED | OUTPATIENT
Start: 2023-05-16

## 2023-05-16 RX ORDER — DAPAGLIFLOZIN AND METFORMIN HYDROCHLORIDE 10; 1000 MG/1; MG/1
1 TABLET, FILM COATED, EXTENDED RELEASE ORAL DAILY
Qty: 90 TABLET | Refills: 1 | Status: SHIPPED | OUTPATIENT
Start: 2023-05-16

## 2023-05-16 RX ORDER — LEVOTHYROXINE SODIUM 0.07 MG/1
75 TABLET ORAL DAILY
Qty: 90 TABLET | Refills: 1 | Status: SHIPPED | OUTPATIENT
Start: 2023-05-16

## 2023-05-16 NOTE — PROGRESS NOTES
"Chief Complaint  Diabetes (6 month follow up )    Subjective         Jaciel Shaw presents to [facility]  Patient presents to the office today for 6-month follow-up regarding his diabetes.  Did review recent lab work with the patient.  His A1c did increase to 7.8%.  Patient does state that he is working at eating healthier and trying to exercise.  I did discuss incorporating Mounjaro into his regimen.  I did discuss the device and possible side effects.  Patient was educated how to administer the medication.  Sample box was given to the patient.  Pressure is 120/68.  He denies any chest pain shortness breath palpitations at this time.  He does state that he continues to have left shoulder pain although he has completed physical therapy.  He does request a referral to orthopedics.  Patient also states that he does have a history of smoking.  He quit approximately 8 years ago.  He states that he did smoke for over 20 years and smoked a pack a day.       Objective     Vitals:    05/16/23 1550   BP: 120/68   BP Location: Right arm   Patient Position: Sitting   Cuff Size: Adult   Pulse: 106   Temp: 96.5 °F (35.8 °C)   TempSrc: Temporal   SpO2: 96%   Weight: 94.4 kg (208 lb 3.2 oz)   Height: 175.3 cm (69\")      Body mass index is 30.75 kg/m².    BMI is >= 30 and <35. (Class 1 Obesity). The following options were offered after discussion;: nutrition counseling/recommendations        Physical Exam  Vitals reviewed.   Constitutional:       Appearance: Normal appearance.   Cardiovascular:      Rate and Rhythm: Normal rate and regular rhythm.      Pulses: Normal pulses.      Heart sounds: Normal heart sounds, S1 normal and S2 normal. No murmur heard.  Pulmonary:      Effort: Pulmonary effort is normal. No respiratory distress.      Breath sounds: Normal breath sounds.   Skin:     General: Skin is warm and dry.   Neurological:      Mental Status: He is alert and oriented to person, place, " and time.   Psychiatric:         Attention and Perception: Attention normal.         Mood and Affect: Mood normal.         Behavior: Behavior normal.          Result Review :   The following data was reviewed by: BAILEY Whitaker on 05/16/2023:      Procedures    Assessment and Plan   Diagnoses and all orders for this visit:    1. Type 2 diabetes mellitus with hyperglycemia, without long-term current use of insulin (Primary)  -     CBC (No Diff); Future  -     Comprehensive Metabolic Panel; Future  -     Hemoglobin A1c; Future  -     Lipid Panel; Future  -     Microalbumin / Creatinine Urine Ratio - Urine, Clean Catch; Future  -     TSH; Future  -     Tirzepatide (Mounjaro) 5 MG/0.5ML solution pen-injector; Inject 0.5 mL under the skin into the appropriate area as directed 1 (One) Time Per Week.  Dispense: 2 mL; Refill: 0  -     dapagliflozin-metformin HCl ER (Xigduo XR)  MG tablet; Take 1 tablet by mouth Daily.  Dispense: 90 tablet; Refill: 1  -     lisinopril (PRINIVIL,ZESTRIL) 5 MG tablet; Take 1 tablet by mouth Daily.  Dispense: 90 tablet; Refill: 1  -     Tirzepatide (Mounjaro) 2.5 MG/0.5ML solution pen-injector; Inject 0.5 mL under the skin into the appropriate area as directed 1 (One) Time Per Week.  Dispense: 2 mL; Refill: 0    2. Hypothyroidism, unspecified type  -     TSH; Future  -     levothyroxine (SYNTHROID, LEVOTHROID) 75 MCG tablet; Take 1 tablet by mouth Daily.  Dispense: 90 tablet; Refill: 1    3. Hyperlipidemia, unspecified hyperlipidemia type  -     CBC (No Diff); Future  -     Comprehensive Metabolic Panel; Future  -     Lipid Panel; Future    4. Screening for prostate cancer  -     PSA Screen; Future    5. Primary hypertension  -     CBC (No Diff); Future  -     Comprehensive Metabolic Panel; Future  -     Lipid Panel; Future    6. Chronic left shoulder pain  -     Ambulatory Referral to Orthopedic Surgery    7. History of tobacco abuse  -     CT Chest Low Dose Wo;  Future          Follow Up   Return in about 6 months (around 11/16/2023) for Recheck.  Patient was given instructions and counseling regarding his condition or for health maintenance advice. Please see specific information pulled into the AVS if appropriate.

## 2023-05-22 ENCOUNTER — OFFICE VISIT (OUTPATIENT)
Dept: ORTHOPEDIC SURGERY | Facility: CLINIC | Age: 53
End: 2023-05-22
Payer: COMMERCIAL

## 2023-05-22 VITALS — OXYGEN SATURATION: 97 % | HEIGHT: 69 IN | WEIGHT: 208 LBS | BODY MASS INDEX: 30.81 KG/M2 | HEART RATE: 96 BPM

## 2023-05-22 DIAGNOSIS — M25.512 LEFT SHOULDER PAIN, UNSPECIFIED CHRONICITY: Primary | ICD-10-CM

## 2023-05-22 DIAGNOSIS — S43.432A TEAR OF LEFT GLENOID LABRUM, INITIAL ENCOUNTER: ICD-10-CM

## 2023-05-22 DIAGNOSIS — M75.112 INCOMPLETE ROTATOR CUFF TEAR OR RUPTURE OF LEFT SHOULDER, NOT SPECIFIED AS TRAUMATIC: ICD-10-CM

## 2023-05-22 NOTE — PROGRESS NOTES
"Chief Complaint  Pain of the Left Shoulder    Subjective          Jaciel Shaw presents to [facility] for   History of Present Illness    The patient presents here today for evaluation of the left shoulder. The patient reports he was swatting at something across his body last July and injured his left shoulder. He had a course of therapy with improved ROM but no increased strength or relief of pain. He admits to occasional catching in the shoulder. He has no other complaints.     Allergies   Allergen Reactions   • Cephalexin Hives        Social History     Socioeconomic History   • Marital status:    Tobacco Use   • Smoking status: Former     Packs/day: 1.00     Years: 20.00     Pack years: 20.00     Types: Cigarettes     Start date: 1994     Quit date: 9/15/2014     Years since quittin.6   • Smokeless tobacco: Never   Vaping Use   • Vaping Use: Never used   Substance and Sexual Activity   • Alcohol use: Yes     Alcohol/week: 2.0 standard drinks     Types: 2 Cans of beer per week   • Drug use: Never   • Sexual activity: Yes     Partners: Female     Birth control/protection: None        I reviewed the patient's chief complaint, history of present illness, review of systems, past medical history, surgical history, family history, social history, medications, and allergy list.     REVIEW OF SYSTEMS    Constitutional: Denies fevers, chills, weight loss  Cardiovascular: Denies chest pain, shortness of breath  Skin: Denies rashes, acute skin changes  Neurologic: Denies headache, loss of consciousness  MSK: left shoulder pain      Objective   Vital Signs:   Pulse 96   Ht 175.3 cm (69\")   Wt 94.3 kg (208 lb)   SpO2 97%   BMI 30.72 kg/m²     Body mass index is 30.72 kg/m².    Physical Exam    General: Alert. No acute distress.   Left shoulder- Sensation to light touch median, radial, ulnar nerve. Positive AIN, PIN, ulnar nerve motor function. Positive pulses. Non-tender " to the biceps or acromioclavicular joint. Forward elevation 180. External Rotation 45. Internal rotation waistline. Neurovascularly intact. 4/5 supraspinatus strength with pain. 5/5 infraspinatus. Positive impingement signs. Positive O'megan. Pop with dynamic labral sheer testing.     Procedures    Imaging Results (Most Recent)     Procedure Component Value Units Date/Time    XR Shoulder 2+ View Left [[id number]] Resulted: 05/22/23 1533     Updated: 05/22/23 1534    Narrative:      Indications: Left shoulder pain    Views: AP, Grashey, Scap Y, axillary left shoulder    Findings: No fractures noted.  Mild AC and glenohumeral joint degenerative   changes.  Glenohumeral joint reduced.    Comparative Data: No comparative data available                     Assessment and Plan        XR Shoulder 2+ View Left    Result Date: 5/22/2023  Narrative: Indications: Left shoulder pain Views: AP, Grashey, Scap Y, axillary left shoulder Findings: No fractures noted.  Mild AC and glenohumeral joint degenerative changes.  Glenohumeral joint reduced. Comparative Data: No comparative data available        Diagnoses and all orders for this visit:    1. Left shoulder pain, unspecified chronicity (Primary)  -     XR Shoulder 2+ View Left    2. Incomplete rotator cuff tear or rupture of left shoulder, not specified as traumatic  -     MRI Shoulder Left Without Contrast; Future    3. Tear of left glenoid labrum, initial encounter        Discussed the treatment plan with the patient.  I reviewed the x-rays that were obtained today with the patient. Plan for MRI of the left shoulder to evaluate for a labral tear. The patient expressed understanding and wished to proceed.        Call or return if worsening symptoms.    Scribed for Samir Mancuso MD by Annmarie Crow  05/22/2023   14:48 EDT         Follow Up       MRI results    Patient was given instructions and counseling regarding his condition or for health maintenance advice. Please  see specific information pulled into the AVS if appropriate.       I have personally performed the services described in this document as scribed by the above individual and it is both accurate and complete.     Samir Mancuso MD  05/22/23  16:19 EDT

## 2023-06-01 ENCOUNTER — HOSPITAL ENCOUNTER (OUTPATIENT)
Dept: CT IMAGING | Facility: HOSPITAL | Age: 53
Discharge: HOME | End: 2023-06-01
Admitting: NURSE PRACTITIONER

## 2023-06-01 DIAGNOSIS — Z87.891 HISTORY OF TOBACCO ABUSE: ICD-10-CM

## 2023-06-01 PROCEDURE — 71271 CT THORAX LUNG CANCER SCR C-: CPT

## 2023-06-07 ENCOUNTER — TELEPHONE (OUTPATIENT)
Dept: SURGERY | Facility: CLINIC | Age: 53
End: 2023-06-07
Payer: COMMERCIAL

## 2023-06-07 NOTE — TELEPHONE ENCOUNTER
"----- Message from Jannet Rodriguez, RN sent at 6/7/2023  8:52 AM EDT -----  Regarding: FW: Colonoscopy   Contact: [phone]    ----- Message -----  From: Jaciel Shaw \"Marta\"  Sent: 6/6/2023   8:55 PM EDT  To: [facility] Clinical Pool  Subject: Colonoscopy                                      I need to cancel my appointment with Dr Perry in august. Please call me to confirm this at your convenience. Thank you, Jaciel Shaw    "

## 2023-06-08 ENCOUNTER — HOSPITAL ENCOUNTER (OUTPATIENT)
Dept: MRI IMAGING | Facility: HOSPITAL | Age: 53
Discharge: HOME | End: 2023-06-08
Payer: COMMERCIAL

## 2023-06-08 DIAGNOSIS — M75.112 INCOMPLETE ROTATOR CUFF TEAR OR RUPTURE OF LEFT SHOULDER, NOT SPECIFIED AS TRAUMATIC: ICD-10-CM

## 2023-06-08 PROCEDURE — 73221 MRI JOINT UPR EXTREM W/O DYE: CPT

## 2023-06-19 ENCOUNTER — OFFICE VISIT (OUTPATIENT)
Dept: ORTHOPEDIC SURGERY | Facility: CLINIC | Age: 53
End: 2023-06-19
Payer: COMMERCIAL

## 2023-06-19 VITALS
HEIGHT: 69 IN | BODY MASS INDEX: 30.66 KG/M2 | DIASTOLIC BLOOD PRESSURE: 81 MMHG | SYSTOLIC BLOOD PRESSURE: 116 MMHG | WEIGHT: 207 LBS | HEART RATE: 94 BPM | OXYGEN SATURATION: 95 %

## 2023-06-19 DIAGNOSIS — M89.512 OSTEOLYSIS OF ACROMIAL END OF LEFT CLAVICLE: Primary | ICD-10-CM

## 2023-06-19 RX ORDER — TRIAMCINOLONE ACETONIDE 40 MG/ML
40 INJECTION, SUSPENSION INTRA-ARTICULAR; INTRAMUSCULAR
Status: COMPLETED | OUTPATIENT
Start: 2023-06-19 | End: 2023-06-19

## 2023-06-19 RX ORDER — LIDOCAINE HYDROCHLORIDE 10 MG/ML
5 INJECTION, SOLUTION INFILTRATION; PERINEURAL
Status: COMPLETED | OUTPATIENT
Start: 2023-06-19 | End: 2023-06-19

## 2023-06-19 RX ADMIN — TRIAMCINOLONE ACETONIDE 40 MG: 40 INJECTION, SUSPENSION INTRA-ARTICULAR; INTRAMUSCULAR at 16:22

## 2023-06-19 RX ADMIN — LIDOCAINE HYDROCHLORIDE 5 ML: 10 INJECTION, SOLUTION INFILTRATION; PERINEURAL at 16:22

## 2023-06-19 NOTE — PROGRESS NOTES
"Chief Complaint  Pain and Follow-up of the Left Shoulder    Subjective          Jaciel Shaw presents to [facility] for   History of Present Illness    The patient presents here today for follow up evaluation of the left shoulder. The patient recently had an MRI and is here today for those results. To review, he patient reports he was swatting at something across his body last July and injured his left shoulder. He had a course of therapy with improved ROM but no increased strength or relief of pain. He admits to occasional catching in the shoulder. He has no other complaints.     Allergies   Allergen Reactions    Cephalexin Hives        Social History     Socioeconomic History    Marital status:    Tobacco Use    Smoking status: Former     Packs/day: 1.00     Years: 20.00     Pack years: 20.00     Types: Cigarettes     Start date: 1994     Quit date: 9/15/2014     Years since quittin.7    Smokeless tobacco: Never   Vaping Use    Vaping Use: Never used   Substance and Sexual Activity    Alcohol use: Yes     Alcohol/week: 2.0 standard drinks     Types: 2 Cans of beer per week    Drug use: Never    Sexual activity: Yes     Partners: Female     Birth control/protection: None        I reviewed the patient's chief complaint, history of present illness, review of systems, past medical history, surgical history, family history, social history, medications, and allergy list.     REVIEW OF SYSTEMS    Constitutional: Denies fevers, chills, weight loss  Cardiovascular: Denies chest pain, shortness of breath  Skin: Denies rashes, acute skin changes  Neurologic: Denies headache, loss of consciousness  MSK: Left shoulder pain      Objective   Vital Signs:   /81   Pulse 94   Ht 175.3 cm (69\")   Wt 93.9 kg (207 lb)   SpO2 95%   BMI 30.57 kg/m²     Body mass index is 30.57 kg/m².    Physical Exam    General: Alert. No acute distress.   Left shoulder- Sensation to light " touch median, radial, ulnar nerve. Positive AIN, PIN, ulnar nerve motor function. Positive pulses. Non-tender to the biceps or acromioclavicular joint. Forward elevation 180. External Rotation 45. Internal rotation waistline. Neurovascularly intact. 4/5 supraspinatus strength with pain. 5/5 infraspinatus. Positive impingement signs. Positive O'megan. Pop with dynamic labral sheer testing.      Large Joint Arthrocentesis  Date/Time: 6/19/2023 4:22 PM  Consent given by: patient  Site marked: site marked  Timeout: Immediately prior to procedure a time out was called to verify the correct patient, procedure, equipment, support staff and site/side marked as required   Supporting Documentation  Indications: pain   Procedure Details  Location: shoulder (left) -   Needle gauge: 21g.  Medications administered: 40 mg triamcinolone acetonide 40 MG/ML; 5 mL lidocaine 1 %  Patient tolerance: patient tolerated the procedure well with no immediate complications      Imaging Results (Most Recent)       None                     Assessment and Plan        XR Shoulder 2+ View Left    Result Date: 5/22/2023  Narrative: Indications: Left shoulder pain Views: AP, Grashey, Scap Y, axillary left shoulder Findings: No fractures noted.  Mild AC and glenohumeral joint degenerative changes.  Glenohumeral joint reduced. Comparative Data: No comparative data available     MRI Shoulder Left Without Contrast    Result Date: 6/8/2023  Narrative: PROCEDURE: MRI SHOULDER LEFT WO CONTRAST  COMPARISON: None  INDICATIONS: Left shoulder trauma ~Sept 2023, rotator cuff tear suspected, xray done. Left shoulder pain.      TECHNIQUE: A variety of imaging planes and parameters were utilized for visualization of suspected pathology.  Images were performed without contrast.   FINDINGS:  No fracture or malalignment is identified.  Marrow edema is noted in the distal clavicle and adjacent acromion.  A trace AC joint effusion is noted.  A type 2 acromion is  present.  Mild infra spinatus tendinopathy is noted.  Mild subscapularis tendinopathy is noted.  There is marked fatty atrophy of the teres minor muscle body.  No neural impinging lesion is identified within the quadrangular space.  The biceps long head tendon and its attachment to the superior labrum are intact.  No labral tear is identified.  The inferior labrum appears diminutive.  No significant glenohumeral joint effusion is noted.  Cartilage in the joint is intact.  No loose body is seen.  Mild osteoarthritic spurring is also noted in the glenohumeral joint.      Impression:   1. Edema in the distal clavicle and adjacent acromion may be secondary to osteolysis related to chronic repetitive type injury.  A grade 1 AC joint injury would also be in the differential. 2. Mild infra spinatus and subscapularis tendinopathy 3. Marked fatty atrophy of the teres minor muscle body      Warner Ribera M.D.       Electronically Signed and Approved By: Warner Ribera M.D. on 2023 at 14:08             CT Chest Low Dose Cancer Screening WO    Result Date: 2023  Narrative: PROCEDURE: CT CHEST LOW DOSE CANCER SCREENING WO  COMPARISON: [facility], PET, SKULL BASE TO MID THIGH SUBSEQ, 2015, 11:53.  REASON FOR SCREENING: Patient is 53 years of age, asymptomatic, and has a smoking history of more than 30 pack years. SMOKING STATUS: Former smoker.  Years since quittin    SCREENING VISIT: Baseline.      TECHNIQUE: Axial unenhanced LDCT images from the apices through mid-kidney were obtained. Evaluation of solid organs and vascular structures is suboptimal due to the lack of IV contrast. Imaging was performed on a Siemens Definition AS CT scanner.  RADIATION: CT Dose Index Vol (CTDIvol):  1.65  mGy  Dose Length Product (DLP):  60  mGy-cm  DIAGNOSTIC QUALITY: Satisfactory  LUNG-RADS CLASSIFICATION: 1 (negative, less than 1% chance of malignancy)  FINDINGS:  LUNG NODULES: Several prominent partially  calcified granulomas are noted.  No concerning pulmonary nodule is identified. LUNGS:  COPD: None. FIBROSIS: None LYMPH NODES: None.  OTHER FINDINGS: None.   RIGHT PLEURAL SPACE:  EFFUSION: None. CALCIFICATION: None. THICKENING: None. PNEUMOTHORAX: None.  LEFT PLEURAL SPACE:  EFFUSION: None. CALCIFICATION: None. THICKENING: None. PNEUMOTHORAX: None.  HEART:  SIZE: Normal. CORONARY CALC: None. PERICARDIAL EFFUSION: None. OTHER FINDINGS: None.   UPPER ABDOMEN: None.  THORAX: None.  BASE OF NECK: None.       Impression:   1. No evidence of lung cancer. 2. No acute cardiopulmonary process.      LAM HADDAD MD       Electronically Signed and Approved By: LAM HADDAD MD on 6/02/2023 at 14:10               Diagnoses and all orders for this visit:    1. 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  I reviewed the MRI with the patient. Plan for conservative treatment at this time. I discussed activity modifications with the patient. Home exercises given today. Discussed the risks and benefits of a left acromioclavicular joint and subacromial injection. The patient expressed understanding and wished to proceed. He tolerated the injection well,    Call or return if worsening symptoms.    Scribed for Samir Mancuso MD by Annmarie Crow  06/19/2023   16:06 EDT         Follow Up       3 months    Patient was given instructions and counseling regarding his condition or for health maintenance advice. Please see specific information pulled into the AVS if appropriate.       I have personally performed the services described in this document as scribed by the above individual and it is both accurate and complete.     Samir Mancuso MD  06/19/23  17:04 EDT

## 2023-08-25 ENCOUNTER — ANESTHESIA (OUTPATIENT)
Dept: GASTROENTEROLOGY | Facility: HOSPITAL | Age: 53
End: 2023-08-25
Payer: COMMERCIAL

## 2023-08-25 ENCOUNTER — ANESTHESIA EVENT (OUTPATIENT)
Dept: GASTROENTEROLOGY | Facility: HOSPITAL | Age: 53
End: 2023-08-25
Payer: COMMERCIAL

## 2023-08-25 ENCOUNTER — HOSPITAL ENCOUNTER (OUTPATIENT)
Facility: HOSPITAL | Age: 53
Setting detail: HOSPITAL OUTPATIENT SURGERY
Discharge: HOME | End: 2023-08-25
Attending: SURGERY | Admitting: SURGERY
Payer: COMMERCIAL

## 2023-08-25 VITALS
BODY MASS INDEX: 27.3 KG/M2 | TEMPERATURE: 97.4 F | WEIGHT: 184.3 LBS | HEIGHT: 69 IN | RESPIRATION RATE: 16 BRPM | DIASTOLIC BLOOD PRESSURE: 67 MMHG | HEART RATE: 73 BPM | OXYGEN SATURATION: 99 % | SYSTOLIC BLOOD PRESSURE: 105 MMHG

## 2023-08-25 DIAGNOSIS — Z12.11 SCREENING FOR MALIGNANT NEOPLASM OF COLON: ICD-10-CM

## 2023-08-25 LAB — GLUCOSE BLDC GLUCOMTR-MCNC: 111 MG/DL (ref 70–99)

## 2023-08-25 PROCEDURE — 82948 REAGENT STRIP/BLOOD GLUCOSE: CPT

## 2023-08-25 PROCEDURE — 88305 TISSUE EXAM BY PATHOLOGIST: CPT | Performed by: SURGERY

## 2023-08-25 PROCEDURE — 25010000002 PROPOFOL 10 MG/ML EMULSION: Performed by: NURSE ANESTHETIST, CERTIFIED REGISTERED

## 2023-08-25 RX ORDER — LIDOCAINE HYDROCHLORIDE 20 MG/ML
INJECTION, SOLUTION EPIDURAL; INFILTRATION; INTRACAUDAL; PERINEURAL AS NEEDED
Status: DISCONTINUED | OUTPATIENT
Start: 2023-08-25 | End: 2023-08-25 | Stop reason: SURG

## 2023-08-25 RX ORDER — SODIUM CHLORIDE, SODIUM LACTATE, POTASSIUM CHLORIDE, CALCIUM CHLORIDE 600; 310; 30; 20 MG/100ML; MG/100ML; MG/100ML; MG/100ML
30 INJECTION, SOLUTION INTRAVENOUS CONTINUOUS
Status: DISCONTINUED | OUTPATIENT
Start: 2023-08-25 | End: 2023-08-25 | Stop reason: HOSPADM

## 2023-08-25 RX ORDER — PROPOFOL 10 MG/ML
VIAL (ML) INTRAVENOUS AS NEEDED
Status: DISCONTINUED | OUTPATIENT
Start: 2023-08-25 | End: 2023-08-25 | Stop reason: SURG

## 2023-08-25 RX ORDER — SODIUM CHLORIDE, SODIUM LACTATE, POTASSIUM CHLORIDE, CALCIUM CHLORIDE 600; 310; 30; 20 MG/100ML; MG/100ML; MG/100ML; MG/100ML
INJECTION, SOLUTION INTRAVENOUS CONTINUOUS PRN
Status: DISCONTINUED | OUTPATIENT
Start: 2023-08-25 | End: 2023-08-25 | Stop reason: SURG

## 2023-08-25 RX ORDER — ONDANSETRON 2 MG/ML
4 INJECTION INTRAMUSCULAR; INTRAVENOUS ONCE AS NEEDED
Status: DISCONTINUED | OUTPATIENT
Start: 2023-08-25 | End: 2023-08-25 | Stop reason: HOSPADM

## 2023-08-25 RX ORDER — ONDANSETRON 4 MG/1
4 TABLET, FILM COATED ORAL ONCE AS NEEDED
Status: DISCONTINUED | OUTPATIENT
Start: 2023-08-25 | End: 2023-08-25 | Stop reason: HOSPADM

## 2023-08-25 RX ADMIN — LIDOCAINE HYDROCHLORIDE 100 MG: 20 INJECTION, SOLUTION EPIDURAL; INFILTRATION; INTRACAUDAL; PERINEURAL at 07:53

## 2023-08-25 RX ADMIN — SODIUM CHLORIDE, POTASSIUM CHLORIDE, SODIUM LACTATE AND CALCIUM CHLORIDE: 600; 310; 30; 20 INJECTION, SOLUTION INTRAVENOUS at 07:52

## 2023-08-25 RX ADMIN — PROPOFOL 50 MG: 10 INJECTION, EMULSION INTRAVENOUS at 07:53

## 2023-08-25 RX ADMIN — SODIUM CHLORIDE, POTASSIUM CHLORIDE, SODIUM LACTATE AND CALCIUM CHLORIDE 30 ML/HR: 600; 310; 30; 20 INJECTION, SOLUTION INTRAVENOUS at 07:18

## 2023-08-25 RX ADMIN — PROPOFOL 333 MCG/KG/MIN: 10 INJECTION, EMULSION INTRAVENOUS at 07:53

## 2023-08-25 NOTE — ANESTHESIA POSTPROCEDURE EVALUATION
Patient: Jaciel Shaw    Procedure Summary       Date: 08/25/23 Room / Location: [facility] ENDOSCOPY 3 / [facility]COPY    Anesthesia Start: 0752 Anesthesia Stop: 0821    Procedure: COLONOSCOPY WITH COLD SNARE POLYPECTOMY Diagnosis:       Screening for malignant neoplasm of colon      (Screening for malignant neoplasm of colon [Z12.11])    Surgeons: Mickey Perry MD Provider: Chirag Fairbanks MD    Anesthesia Type: general ASA Status: 3            Anesthesia Type: general    Vitals  Vitals Value Taken Time   /79 08/25/23 0841   Temp 36.3 øC (97.4 øF) 08/25/23 0820   Pulse 85 08/25/23 0839   Resp 16 08/25/23 0840   SpO2 99 % 08/25/23 0839   Vitals shown include unvalidated device data.        Post Anesthesia Care and Evaluation    Patient location during evaluation: bedside  Patient participation: complete - patient participated  Level of consciousness: awake  Pain management: adequate    Airway patency: patent  PONV Status: none  Cardiovascular status: acceptable and stable  Respiratory status: acceptable  Hydration status: acceptable    Comments: An Anesthesiologist personally participated in the most demanding procedures (including induction and emergence if applicable) in the anesthesia plan, monitored the course of anesthesia administration at frequent intervals and remained physically present and available for immediate diagnosis and treatment of emergencies.          
no

## 2023-08-25 NOTE — ANESTHESIA PREPROCEDURE EVALUATION
Anesthesia Evaluation     Patient summary reviewed and Nursing notes reviewed   no history of anesthetic complications:   NPO Solid Status: > 8 hours  NPO Liquid Status: > 2 hours           Airway   Mallampati: II  TM distance: >3 FB  Neck ROM: full  No difficulty expected  Dental      Pulmonary - normal exam    breath sounds clear to auscultation  (+) a smoker Former,  Cardiovascular - negative cardio ROS and normal exam  Exercise tolerance: good (4-7 METS)    Rhythm: regular  Rate: normal        Neuro/Psych- negative ROS  GI/Hepatic/Renal/Endo    (+) diabetes mellitus, thyroid problem     Musculoskeletal (-) negative ROS    Abdominal    Substance History - negative use     OB/GYN negative ob/gyn ROS         Other - negative ROS       ROS/Med Hx Other: PAT Nursing Notes unavailable.                 Anesthesia Plan    ASA 3     general       Anesthetic plan, risks, benefits, and alternatives have been provided, discussed and informed consent has been obtained with: patient and spouse/significant other.    CODE STATUS:

## 2023-08-25 NOTE — H&P
[facility]   HISTORY AND PHYSICAL    Patient Name: Jaciel Shaw  : 1970  MRN: [id number]  Primary Care Physician:  Ryan Ambrose APRN  Date of admission: 2023    Subjective   Subjective     Chief Complaint: Colon cancer screening    HPI:    Jaciel Shaw is a 53 y.o. male who presents for colon cancer screening.    Review of Systems   Respiratory:  Negative for shortness of breath.    Cardiovascular:  Negative for chest pain.     Personal History     Past Medical History:   Diagnosis Date    Allergic 3/88    Displaced fracture of neck of fifth metacarpal bone, right hand, initial encounter for closed fracture 2015    DM2 (diabetes mellitus, type 2) 2016    Diet controlled    HLD (hyperlipidemia) 2016    Hypothyroidism 10/27/2017    Seasonal allergies        Past Surgical History:   Procedure Laterality Date    EYE SURGERY      Detached retina    SINUS SURGERY         Family History: family history includes Heart disease in his father, maternal aunt, maternal uncle, mother, and paternal grandfather. Otherwise pertinent FHx was reviewed and not pertinent to current issue.    Social History:  reports that he quit smoking about 8 years ago. His smoking use included cigarettes. He started smoking about 29 years ago. He has a 20.00 pack-year smoking history. He has never used smokeless tobacco. He reports current alcohol use of about 2.0 standard drinks per week. He reports that he does not use drugs.    Home Medications:  Dapagliflozin-metFORMIN HCl, Tirzepatide, levothyroxine, lisinopril, and loratadine    Allergies:  Allergies   Allergen Reactions    Cephalexin Hives       Objective    Objective     Vitals:   Temp:  [97.2 øF (36.2 øC)] 97.2 øF (36.2 øC)  Heart Rate:  [83] 83  Resp:  [16] 16  BP: (123)/(84) 123/84    Physical Exam  HENT:      Head: Normocephalic.   Cardiovascular:      Rate and Rhythm: Normal rate.   Pulmonary:      Effort: Pulmonary effort is normal.    Abdominal:      Palpations: Abdomen is soft.   Musculoskeletal:         General: Normal range of motion.      Cervical back: Normal range of motion.   Skin:     General: Skin is warm.   Neurological:      General: No focal deficit present.      Mental Status: He is alert.   Psychiatric:         Mood and Affect: Mood normal.       Result Review    Result Review:  I have personally reviewed the results from the time of this admission to 8/25/2023 07:25 EDT and agree with these findings:  []  Laboratory  []  Microbiology  []  Radiology  []  EKG/Telemetry   []  Cardiology/Vascular   []  Pathology  []  Old records  []  Other:  Most notable findings include:     Assessment & Plan   Assessment / Plan     Brief Patient Summary:  Jaciel Shaw is a 53 y.o. male who presents for colon cancer screening.    Active Hospital Problems:  Active Hospital Problems    Diagnosis     **Screening for malignant neoplasm of colon        Plan:   Proceed with a colonoscopy.  Risk benefits and alternatives were explained.    DVT prophylaxis:  No DVT prophylaxis order currently exists.    CODE STATUS:         Admission Status:  I believe this patient meets outpatient status.    Electronically signed by Mickey Perry MD, 08/25/23, 7:25 AM EDT.

## 2023-08-28 LAB
CYTO UR: NORMAL
LAB AP CASE REPORT: NORMAL
LAB AP CLINICAL INFORMATION: NORMAL
PATH REPORT.FINAL DX SPEC: NORMAL
PATH REPORT.GROSS SPEC: NORMAL

## 2023-09-06 RX ORDER — SCOLOPAMINE TRANSDERMAL SYSTEM 1 MG/1
1 PATCH, EXTENDED RELEASE TRANSDERMAL
Qty: 4 PATCH | Refills: 0 | Status: SHIPPED | OUTPATIENT
Start: 2023-09-06

## 2023-09-18 ENCOUNTER — OFFICE VISIT (OUTPATIENT)
Dept: SURGERY | Facility: CLINIC | Age: 53
End: 2023-09-18
Payer: COMMERCIAL

## 2023-09-18 VITALS — BODY MASS INDEX: 28.88 KG/M2 | WEIGHT: 195 LBS | RESPIRATION RATE: 16 BRPM | HEIGHT: 69 IN

## 2023-09-18 DIAGNOSIS — Z12.11 SCREENING FOR MALIGNANT NEOPLASM OF COLON: Primary | ICD-10-CM

## 2023-09-18 PROCEDURE — S0285 CNSLT BEFORE SCREEN COLONOSC: HCPCS | Performed by: SURGERY

## 2023-09-18 NOTE — PROGRESS NOTES
Chief Complaint  Colonoscopy and Post-op    Subjective          Jaciel Shaw presents to [facility] SUR[facility]ERY  History of Present Illness    Jaciel Shaw is a 53 y.o. male  who presents today for a postoperative visit.     Patient is here for a follow-up after a recent colonoscopy.  We removed a small sigmoid polyp that fortunately came back consistent with a hyperplastic polyp.  He is doing fine and had no complaints.    Past History:  Medical History: has a past medical history of Allergic (3/88), Displaced fracture of neck of fifth metacarpal bone, right hand, initial encounter for closed fracture (11/13/2015), DM2 (diabetes mellitus, type 2) (04/26/2016), HLD (hyperlipidemia) (04/28/2016), Hypothyroidism (10/27/2017), and Seasonal allergies.   Surgical History: has a past surgical history that includes Eye surgery (1987); Sinus surgery (9/20); and Colonoscopy (N/A, 8/25/2023).   Family History: family history includes Heart disease in his father, maternal aunt, maternal uncle, mother, and paternal grandfather.   Social History: reports that he quit smoking about 9 years ago. His smoking use included cigarettes. He started smoking about 29 years ago. He has a 20.00 pack-year smoking history. He has never used smokeless tobacco. He reports current alcohol use of about 2.0 standard drinks per week. He reports that he does not use drugs.  Allergies: Cephalexin       Current Outpatient Medications:     Dapagliflozin-metFORMIN HCl (XIGDUO XR PO), Take  by mouth. Not sure dosage., Disp: , Rfl:     levothyroxine (SYNTHROID, LEVOTHROID) 75 MCG tablet, Take 1 tablet by mouth Daily., Disp: 90 tablet, Rfl: 1    lisinopril (PRINIVIL,ZESTRIL) 5 MG tablet, Take 1 tablet by mouth Daily., Disp: 90 tablet, Rfl: 1    loratadine (CLARITIN) 10 MG tablet, Take 1 tablet by mouth Daily., Disp: 90 tablet, Rfl: 3    Scopolamine 1 MG/3DAYS patch, Place 1 patch on the skin as directed by provider Every 72  "(Seventy-Two) Hours., Disp: 4 patch, Rfl: 0    Tirzepatide (Mounjaro) 5 MG/0.5ML solution pen-injector, Inject 0.5 mL under the skin into the appropriate area as directed 1 (One) Time Per Week., Disp: 6 mL, Rfl: 1       Physical Exam  He appears well and his abdomen is soft.  Objective     Vital Signs:   Resp 16   Ht 175.3 cm (69.02\")   Wt 88.5 kg (195 lb)   BMI 28.78 kg/m²              Assessment and Plan    Diagnoses and all orders for this visit:    1. Screening for malignant neoplasm of colon (Primary)    I have recommended that he repeat his colonoscopy in 10 years.  Otherwise we will see him back on an as-needed basis.      "

## 2023-09-18 NOTE — LETTER
September 18, 2023     BAILEY Whitaker  [address]  [address]  [place] [address]    Patient: Jaciel Shaw   YOB: 1970   Date of Visit: 9/18/2023     Dear BAILEY Whitaker:       Thank you for referring Jaciel Shaw to me for evaluation. Below are the relevant portions of my assessment and plan of care.    If you have questions, please do not hesitate to call me. I look forward to following Jaciel along with you.         Sincerely,        Mickey Perry MD        CC: No Recipients    Mickey Perry MD  09/18/23 1330  Sign when Signing Visit  Chief Complaint  Colonoscopy and Post-op    Subjective          Jaciel Shaw presents to [facility] SUR[facility]ERY  History of Present Illness    Jaciel Shaw is a 53 y.o. male  who presents today for a postoperative visit.     Patient is here for a follow-up after a recent colonoscopy.  We removed a small sigmoid polyp that fortunately came back consistent with a hyperplastic polyp.  He is doing fine and had no complaints.    Past History:  Medical History: has a past medical history of Allergic (3/88), Displaced fracture of neck of fifth metacarpal bone, right hand, initial encounter for closed fracture (11/13/2015), DM2 (diabetes mellitus, type 2) (04/26/2016), HLD (hyperlipidemia) (04/28/2016), Hypothyroidism (10/27/2017), and Seasonal allergies.   Surgical History: has a past surgical history that includes Eye surgery (1987); Sinus surgery (9/20); and Colonoscopy (N/A, 8/25/2023).   Family History: family history includes Heart disease in his father, maternal aunt, maternal uncle, mother, and paternal grandfather.   Social History: reports that he quit smoking about 9 years ago. His smoking use included cigarettes. He started smoking about 29 years ago. He has a 20.00 pack-year smoking history. He has never used smokeless tobacco. He reports current alcohol use of about 2.0 standard drinks per week. He reports that  "he does not use drugs.  Allergies: Cephalexin       Current Outpatient Medications:   •  Dapagliflozin-metFORMIN HCl (XIGDUO XR PO), Take  by mouth. Not sure dosage., Disp: , Rfl:   •  levothyroxine (SYNTHROID, LEVOTHROID) 75 MCG tablet, Take 1 tablet by mouth Daily., Disp: 90 tablet, Rfl: 1  •  lisinopril (PRINIVIL,ZESTRIL) 5 MG tablet, Take 1 tablet by mouth Daily., Disp: 90 tablet, Rfl: 1  •  loratadine (CLARITIN) 10 MG tablet, Take 1 tablet by mouth Daily., Disp: 90 tablet, Rfl: 3  •  Scopolamine 1 MG/3DAYS patch, Place 1 patch on the skin as directed by provider Every 72 (Seventy-Two) Hours., Disp: 4 patch, Rfl: 0  •  Tirzepatide (Mounjaro) 5 MG/0.5ML solution pen-injector, Inject 0.5 mL under the skin into the appropriate area as directed 1 (One) Time Per Week., Disp: 6 mL, Rfl: 1       Physical Exam  He appears well and his abdomen is soft.  Objective     Vital Signs:   Resp 16   Ht 175.3 cm (69.02\")   Wt 88.5 kg (195 lb)   BMI 28.78 kg/m²              Assessment and Plan    Diagnoses and all orders for this visit:    1. Screening for malignant neoplasm of colon (Primary)    I have recommended that he repeat his colonoscopy in 10 years.  Otherwise we will see him back on an as-needed basis.      "

## 2023-09-25 ENCOUNTER — OFFICE VISIT (OUTPATIENT)
Dept: ORTHOPEDIC SURGERY | Facility: CLINIC | Age: 53
End: 2023-09-25

## 2023-09-25 VITALS
OXYGEN SATURATION: 96 % | HEART RATE: 88 BPM | WEIGHT: 195 LBS | HEIGHT: 69 IN | SYSTOLIC BLOOD PRESSURE: 114 MMHG | DIASTOLIC BLOOD PRESSURE: 77 MMHG | BODY MASS INDEX: 28.88 KG/M2

## 2023-09-25 DIAGNOSIS — M89.512 OSTEOLYSIS OF ACROMIAL END OF LEFT CLAVICLE: Primary | ICD-10-CM

## 2023-09-25 NOTE — PROGRESS NOTES
"Chief Complaint  Pain and Follow-up of the Left Shoulder    Subjective          Jaciel Shaw presents to [facility] for   History of Present Illness    The patient presents here today for follow up evaluation of the left shoulder. The patient has been treating his acromioclavicular joint osteolysis conservatively. He reports relief with the previous injection. He is happy with his progress so far.       Allergies   Allergen Reactions    Cephalexin Hives        Social History     Socioeconomic History    Marital status:    Tobacco Use    Smoking status: Former     Packs/day: 1.00     Years: 20.00     Pack years: 20.00     Types: Cigarettes     Start date: 1994     Quit date: 9/15/2014     Years since quittin.0    Smokeless tobacco: Never   Vaping Use    Vaping Use: Never used   Substance and Sexual Activity    Alcohol use: Yes     Alcohol/week: 2.0 standard drinks     Types: 2 Cans of beer per week    Drug use: Never    Sexual activity: Yes     Partners: Female     Birth control/protection: None        I reviewed the patient's chief complaint, history of present illness, review of systems, past medical history, surgical history, family history, social history, medications, and allergy list.     REVIEW OF SYSTEMS    Constitutional: Denies fevers, chills, weight loss  Cardiovascular: Denies chest pain, shortness of breath  Skin: Denies rashes, acute skin changes  Neurologic: Denies headache, loss of consciousness  MSK: Left shoulder pain      Objective   Vital Signs:   /77   Pulse 88   Ht 175.3 cm (69\")   Wt 88.5 kg (195 lb)   SpO2 96%   BMI 28.80 kg/m²     Body mass index is 28.8 kg/m².    Physical Exam    General: Alert. No acute distress.   Left shoulder-  Sensation to light touch median, radial, ulnar nerve. Positive AIN, PIN, ulnar nerve motor function. Positive pulses. Non-tender to the biceps or acromioclavicular joint. Forward elevation 180. External " Rotation 45. Internal rotation waistline. Neurovascularly intact. 4/5 supraspinatus strength with pain. 5/5 infraspinatus. Positive impingement signs. Positive O'megan. Pop with dynamic labral sheer testing.       Procedures    Imaging Results (Most Recent)       None                     Assessment and Plan        No results found.     Diagnoses and all orders for this visit:    1. 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  Plan to continue conservative treatment at this time. Plan for activity as tolerated.     He can have repeat injections as needed and may call going forward.     Call or return if worsening symptoms.    Scribed for Samir Mancuso MD by Annmarie Crow  09/25/2023   16:12 EDT         Follow Up       PRN    Patient was given instructions and counseling regarding his condition or for health maintenance advice. Please see specific information pulled into the AVS if appropriate.       I have personally performed the services described in this document as scribed by the above individual and it is both accurate and complete.     Samir Mancuso MD  09/25/23  16:32 EDT

## 2023-11-07 DIAGNOSIS — E03.9 HYPOTHYROIDISM, UNSPECIFIED TYPE: ICD-10-CM

## 2023-11-07 DIAGNOSIS — E11.65 TYPE 2 DIABETES MELLITUS WITH HYPERGLYCEMIA, WITHOUT LONG-TERM CURRENT USE OF INSULIN: ICD-10-CM

## 2023-11-07 RX ORDER — LISINOPRIL 5 MG/1
5 TABLET ORAL DAILY
Qty: 90 TABLET | Refills: 1 | Status: SHIPPED | OUTPATIENT
Start: 2023-11-07

## 2023-11-07 RX ORDER — DAPAGLIFLOZIN AND METFORMIN HYDROCHLORIDE 10; 1000 MG/1; MG/1
1 TABLET, FILM COATED, EXTENDED RELEASE ORAL DAILY
Qty: 90 TABLET | Refills: 1 | Status: SHIPPED | OUTPATIENT
Start: 2023-11-07

## 2023-11-07 RX ORDER — LEVOTHYROXINE SODIUM 0.07 MG/1
75 TABLET ORAL DAILY
Qty: 90 TABLET | Refills: 1 | Status: SHIPPED | OUTPATIENT
Start: 2023-11-07

## 2023-11-17 ENCOUNTER — LAB (OUTPATIENT)
Dept: LAB | Facility: HOSPITAL | Age: 53
End: 2023-11-17
Payer: COMMERCIAL

## 2023-11-17 DIAGNOSIS — E03.9 HYPOTHYROIDISM, UNSPECIFIED TYPE: ICD-10-CM

## 2023-11-17 DIAGNOSIS — Z12.5 SCREENING FOR PROSTATE CANCER: ICD-10-CM

## 2023-11-17 DIAGNOSIS — I10 PRIMARY HYPERTENSION: ICD-10-CM

## 2023-11-17 DIAGNOSIS — E11.65 TYPE 2 DIABETES MELLITUS WITH HYPERGLYCEMIA, WITHOUT LONG-TERM CURRENT USE OF INSULIN: ICD-10-CM

## 2023-11-17 DIAGNOSIS — E78.5 HYPERLIPIDEMIA, UNSPECIFIED HYPERLIPIDEMIA TYPE: ICD-10-CM

## 2023-11-17 LAB
ALBUMIN SERPL-MCNC: 4.6 G/DL (ref 3.5–5.2)
ALBUMIN UR-MCNC: <1.2 MG/DL
ALBUMIN/GLOB SERPL: 2 G/DL
ALP SERPL-CCNC: 47 U/L (ref 39–117)
ALT SERPL W P-5'-P-CCNC: 19 U/L (ref 1–41)
ANION GAP SERPL CALCULATED.3IONS-SCNC: 10.9 MMOL/L (ref 5–15)
AST SERPL-CCNC: 16 U/L (ref 1–40)
BILIRUB SERPL-MCNC: 0.4 MG/DL (ref 0–1.2)
BUN SERPL-MCNC: 17 MG/DL (ref 6–20)
BUN/CREAT SERPL: 15.9 (ref 7–25)
CALCIUM SPEC-SCNC: 9.3 MG/DL (ref 8.6–10.5)
CHLORIDE SERPL-SCNC: 102 MMOL/L (ref 98–107)
CHOLEST SERPL-MCNC: 187 MG/DL (ref 0–200)
CO2 SERPL-SCNC: 25.1 MMOL/L (ref 22–29)
CREAT SERPL-MCNC: 1.07 MG/DL (ref 0.76–1.27)
CREAT UR-MCNC: 81.2 MG/DL
DEPRECATED RDW RBC AUTO: 41.2 FL (ref 37–54)
EGFRCR SERPLBLD CKD-EPI 2021: 84 ML/MIN/1.73
ERYTHROCYTE [DISTWIDTH] IN BLOOD BY AUTOMATED COUNT: 11.8 % (ref 12.3–15.4)
GLOBULIN UR ELPH-MCNC: 2.3 GM/DL
GLUCOSE SERPL-MCNC: 140 MG/DL (ref 65–99)
HBA1C MFR BLD: 6.1 % (ref 4.8–5.6)
HCT VFR BLD AUTO: 47.5 % (ref 37.5–51)
HDLC SERPL-MCNC: 36 MG/DL (ref 40–60)
HGB BLD-MCNC: 16.5 G/DL (ref 13–17.7)
LDLC SERPL CALC-MCNC: 116 MG/DL (ref 0–100)
LDLC/HDLC SERPL: 3.08 {RATIO}
MCH RBC QN AUTO: 32.5 PG (ref 26.6–33)
MCHC RBC AUTO-ENTMCNC: 34.7 G/DL (ref 31.5–35.7)
MCV RBC AUTO: 93.7 FL (ref 79–97)
MICROALBUMIN/CREAT UR: NORMAL MG/G{CREAT}
PLATELET # BLD AUTO: 226 10*3/MM3 (ref 140–450)
PMV BLD AUTO: 11.4 FL (ref 6–12)
POTASSIUM SERPL-SCNC: 4.8 MMOL/L (ref 3.5–5.2)
PROT SERPL-MCNC: 6.9 G/DL (ref 6–8.5)
PSA SERPL-MCNC: 0.84 NG/ML (ref 0–4)
RBC # BLD AUTO: 5.07 10*6/MM3 (ref 4.14–5.8)
SODIUM SERPL-SCNC: 138 MMOL/L (ref 136–145)
TRIGL SERPL-MCNC: 201 MG/DL (ref 0–150)
TSH SERPL DL<=0.05 MIU/L-ACNC: 1.23 UIU/ML (ref 0.27–4.2)
VLDLC SERPL-MCNC: 35 MG/DL (ref 5–40)
WBC NRBC COR # BLD AUTO: 6.58 10*3/MM3 (ref 3.4–10.8)

## 2023-11-17 PROCEDURE — G0103 PSA SCREENING: HCPCS

## 2023-11-17 PROCEDURE — 82570 ASSAY OF URINE CREATININE: CPT

## 2023-11-17 PROCEDURE — 36415 COLL VENOUS BLD VENIPUNCTURE: CPT

## 2023-11-17 PROCEDURE — 82043 UR ALBUMIN QUANTITATIVE: CPT

## 2023-11-17 PROCEDURE — 80061 LIPID PANEL: CPT

## 2023-11-17 PROCEDURE — 83036 HEMOGLOBIN GLYCOSYLATED A1C: CPT

## 2023-11-17 PROCEDURE — 80050 GENERAL HEALTH PANEL: CPT

## 2023-11-27 ENCOUNTER — OFFICE VISIT (OUTPATIENT)
Dept: FAMILY MEDICINE CLINIC | Facility: CLINIC | Age: 53
End: 2023-11-27
Payer: COMMERCIAL

## 2023-11-27 VITALS
DIASTOLIC BLOOD PRESSURE: 98 MMHG | SYSTOLIC BLOOD PRESSURE: 130 MMHG | HEIGHT: 69 IN | HEART RATE: 88 BPM | OXYGEN SATURATION: 98 % | WEIGHT: 198.8 LBS | TEMPERATURE: 96 F | BODY MASS INDEX: 29.44 KG/M2

## 2023-11-27 DIAGNOSIS — E11.65 TYPE 2 DIABETES MELLITUS WITH HYPERGLYCEMIA, WITHOUT LONG-TERM CURRENT USE OF INSULIN: ICD-10-CM

## 2023-11-27 DIAGNOSIS — E78.5 HYPERLIPIDEMIA, UNSPECIFIED HYPERLIPIDEMIA TYPE: ICD-10-CM

## 2023-11-27 DIAGNOSIS — E03.9 HYPOTHYROIDISM, UNSPECIFIED TYPE: Primary | ICD-10-CM

## 2023-11-27 NOTE — PROGRESS NOTES
"Chief Complaint  Annual Exam    Subjective         Jaciel Shaw presents to [facility]  Patient presents to the office today for a wellness physical.  I did review recent lab work with the patient.  He denies any concerns or complaints at this time.  He denies any refills at this time.  His A1c is well controlled.  He did lose approximately 22 pounds on Mounjaro.  He states that he stopped the Mounjaro and is watching his diet at this time.         Objective     Vitals:    11/27/23 0657   BP: 130/98   BP Location: Right arm   Patient Position: Sitting   Cuff Size: Adult   Pulse: 88   Temp: 96 °F (35.6 °C)   TempSrc: Temporal   SpO2: 98%   Weight: 90.2 kg (198 lb 12.8 oz)   Height: 175.3 cm (69\")      Body mass index is 29.36 kg/m².             Physical Exam  Vitals reviewed.   Constitutional:       Appearance: Normal appearance.   HENT:      Head: Normocephalic and atraumatic.      Right Ear: Tympanic membrane, ear canal and external ear normal.      Left Ear: Tympanic membrane, ear canal and external ear normal.      Nose: Nose normal.      Mouth/Throat:      Mouth: Mucous membranes are moist.      Pharynx: Oropharynx is clear.   Eyes:      Extraocular Movements: Extraocular movements intact.      Conjunctiva/sclera: Conjunctivae normal.      Pupils: Pupils are equal, round, and reactive to light.   Cardiovascular:      Rate and Rhythm: Normal rate and regular rhythm.      Pulses: Normal pulses.      Heart sounds: Normal heart sounds.   Pulmonary:      Effort: Pulmonary effort is normal.      Breath sounds: Normal breath sounds.   Abdominal:      General: Abdomen is flat. Bowel sounds are normal.      Palpations: Abdomen is soft.   Musculoskeletal:         General: Normal range of motion.      Cervical back: Normal range of motion and neck supple.   Skin:     General: Skin is warm and dry.   Neurological:      General: No focal deficit present.      Mental Status: He is alert " and oriented to person, place, and time.   Psychiatric:         Mood and Affect: Mood normal.         Behavior: Behavior normal.          Result Review :   The following data was reviewed by: BAILEY Whitaker on 11/27/2023:      Procedures    Assessment and Plan   Diagnoses and all orders for this visit:    1. Hypothyroidism, unspecified type (Primary)  -     CBC (No Diff); Future  -     Comprehensive Metabolic Panel; Future  -     Lipid Panel; Future  -     TSH+Free T4; Future  -     Hemoglobin A1c; Future  -     Microalbumin / Creatinine Urine Ratio - Urine, Clean Catch; Future    2. Type 2 diabetes mellitus with hyperglycemia, without long-term current use of insulin  -     CBC (No Diff); Future  -     Comprehensive Metabolic Panel; Future  -     Lipid Panel; Future  -     TSH+Free T4; Future  -     Hemoglobin A1c; Future  -     Microalbumin / Creatinine Urine Ratio - Urine, Clean Catch; Future    3. Hyperlipidemia, unspecified hyperlipidemia type  -     CBC (No Diff); Future  -     Comprehensive Metabolic Panel; Future  -     Lipid Panel; Future  -     TSH+Free T4; Future  -     Hemoglobin A1c; Future  -     Microalbumin / Creatinine Urine Ratio - Urine, Clean Catch; Future      Preventative health that includes healthy diet and exercise.  We also discussed colon cancer screening and prostate cancer screening    Follow Up   Return in about 6 months (around 5/27/2024) for Recheck.  Patient was given instructions and counseling regarding his condition or for health maintenance advice. Please see specific information pulled into the AVS if appropriate.

## 2023-11-29 ENCOUNTER — OFFICE VISIT (OUTPATIENT)
Dept: ORTHOPEDIC SURGERY | Facility: CLINIC | Age: 53
End: 2023-11-29
Payer: COMMERCIAL

## 2023-11-29 VITALS
HEART RATE: 90 BPM | SYSTOLIC BLOOD PRESSURE: 127 MMHG | HEIGHT: 69 IN | WEIGHT: 196 LBS | OXYGEN SATURATION: 98 % | DIASTOLIC BLOOD PRESSURE: 83 MMHG | BODY MASS INDEX: 29.03 KG/M2

## 2023-11-29 DIAGNOSIS — M89.512 OSTEOLYSIS OF ACROMIAL END OF LEFT CLAVICLE: Primary | ICD-10-CM

## 2023-11-29 RX ORDER — LIDOCAINE HYDROCHLORIDE 10 MG/ML
5 INJECTION, SOLUTION INFILTRATION; PERINEURAL
Status: COMPLETED | OUTPATIENT
Start: 2023-11-29 | End: 2023-11-29

## 2023-11-29 RX ORDER — TRIAMCINOLONE ACETONIDE 40 MG/ML
40 INJECTION, SUSPENSION INTRA-ARTICULAR; INTRAMUSCULAR
Status: COMPLETED | OUTPATIENT
Start: 2023-11-29 | End: 2023-11-29

## 2023-11-29 RX ADMIN — TRIAMCINOLONE ACETONIDE 40 MG: 40 INJECTION, SUSPENSION INTRA-ARTICULAR; INTRAMUSCULAR at 08:44

## 2023-11-29 RX ADMIN — LIDOCAINE HYDROCHLORIDE 5 ML: 10 INJECTION, SOLUTION INFILTRATION; PERINEURAL at 08:44

## 2023-11-29 NOTE — PROGRESS NOTES
"Chief Complaint  Follow-up and Pain of the Left Shoulder    Subjective          Jaciel Shaw presents to [facility] for   History of Present Illness    The patient presents here today for follow up evaluation of the left shoulder. The patient has been treating his acromioclavicular joint osteolysis conservatively. He reports relief with the previous injection. He is happy with his progress so far. He is requesting repeat injection today.     Allergies   Allergen Reactions    Cephalexin Hives        Social History     Socioeconomic History    Marital status:    Tobacco Use    Smoking status: Former     Packs/day: 1.00     Years: 20.00     Additional pack years: 0.00     Total pack years: 20.00     Types: Cigarettes     Start date: 1994     Quit date: 9/15/2014     Years since quittin.2    Smokeless tobacco: Never   Vaping Use    Vaping Use: Never used   Substance and Sexual Activity    Alcohol use: Yes     Alcohol/week: 2.0 standard drinks of alcohol     Types: 2 Cans of beer per week    Drug use: Never    Sexual activity: Yes     Partners: Female     Birth control/protection: None        I reviewed the patient's chief complaint, history of present illness, review of systems, past medical history, surgical history, family history, social history, medications, and allergy list.     REVIEW OF SYSTEMS    Constitutional: Denies fevers, chills, weight loss  Cardiovascular: Denies chest pain, shortness of breath  Skin: Denies rashes, acute skin changes  Neurologic: Denies headache, loss of consciousness  MSK: Left shoulder pain      Objective   Vital Signs:   /83   Pulse 90   Ht 175.3 cm (69\")   Wt 88.9 kg (196 lb)   SpO2 98%   BMI 28.94 kg/m²     Body mass index is 28.94 kg/m².    Physical Exam    General: Alert. No acute distress.   Left shoulder-  Sensation to light touch median, radial, ulnar nerve. Positive AIN, PIN, ulnar nerve motor function. Positive " pulses. Non-tender to the biceps or acromioclavicular joint. Forward elevation 180. External Rotation 45. Internal rotation waistline. Neurovascularly intact. 4/5 supraspinatus strength with pain. 5/5 infraspinatus. Positive impingement signs. Positive O'megan. Pop with dynamic labral sheer testing.        Large Joint Arthrocentesis  Date/Time: 11/29/2023 8:44 AM  Consent given by: patient  Site marked: site marked  Timeout: Immediately prior to procedure a time out was called to verify the correct patient, procedure, equipment, support staff and site/side marked as required   Supporting Documentation  Indications: pain   Procedure Details  Location: shoulder (LEFT) -   Needle gauge: 21 G.  Medications administered: 5 mL lidocaine 1 %; 40 mg triamcinolone acetonide 40 MG/ML  Patient tolerance: patient tolerated the procedure well with no immediate complications        Imaging Results (Most Recent)       None                     Assessment and Plan        No results found.     Diagnoses and all orders for this visit:    1. 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  Discussed the risks and benefits of a left acromioclavicular joint and subacromial injection. The patient expressed understanding and wished to proceed. He tolerated the injections well. Plan to continue home exercises and medication.         Call or return if worsening symptoms.    Scribed for Samir Mancuso MD by Annmarie Crow  11/29/2023   08:07 EST         Follow Up       PRN    Patient was given instructions and counseling regarding his condition or for health maintenance advice. Please see specific information pulled into the AVS if appropriate.       I have personally performed the services described in this document as scribed by the above individual and it is both accurate and complete.     Samir Mancuso MD  11/29/23  13:40 EST

## 2024-05-08 DIAGNOSIS — E03.9 HYPOTHYROIDISM, UNSPECIFIED TYPE: ICD-10-CM

## 2024-05-08 RX ORDER — DAPAGLIFLOZIN AND METFORMIN HYDROCHLORIDE 10; 1000 MG/1; MG/1
1 TABLET, FILM COATED, EXTENDED RELEASE ORAL DAILY
Qty: 90 TABLET | Refills: 1 | Status: SHIPPED | OUTPATIENT
Start: 2024-05-08

## 2024-05-08 RX ORDER — LEVOTHYROXINE SODIUM 0.07 MG/1
75 TABLET ORAL DAILY
Qty: 90 TABLET | Refills: 1 | Status: SHIPPED | OUTPATIENT
Start: 2024-05-08

## 2024-05-23 ENCOUNTER — LAB (OUTPATIENT)
Dept: LAB | Facility: HOSPITAL | Age: 54
End: 2024-05-23
Payer: COMMERCIAL

## 2024-05-23 DIAGNOSIS — E78.5 HYPERLIPIDEMIA, UNSPECIFIED HYPERLIPIDEMIA TYPE: ICD-10-CM

## 2024-05-23 DIAGNOSIS — E11.65 TYPE 2 DIABETES MELLITUS WITH HYPERGLYCEMIA, WITHOUT LONG-TERM CURRENT USE OF INSULIN: ICD-10-CM

## 2024-05-23 DIAGNOSIS — E03.9 HYPOTHYROIDISM, UNSPECIFIED TYPE: ICD-10-CM

## 2024-05-23 LAB
ALBUMIN SERPL-MCNC: 4.6 G/DL (ref 3.5–5.2)
ALBUMIN UR-MCNC: <1.2 MG/DL
ALBUMIN/GLOB SERPL: 1.8 G/DL
ALP SERPL-CCNC: 51 U/L (ref 39–117)
ALT SERPL W P-5'-P-CCNC: 25 U/L (ref 1–41)
ANION GAP SERPL CALCULATED.3IONS-SCNC: 12.6 MMOL/L (ref 5–15)
AST SERPL-CCNC: 20 U/L (ref 1–40)
BILIRUB SERPL-MCNC: 0.7 MG/DL (ref 0–1.2)
BUN SERPL-MCNC: 12 MG/DL (ref 6–20)
BUN/CREAT SERPL: 12.5 (ref 7–25)
CALCIUM SPEC-SCNC: 9.6 MG/DL (ref 8.6–10.5)
CHLORIDE SERPL-SCNC: 103 MMOL/L (ref 98–107)
CHOLEST SERPL-MCNC: 195 MG/DL (ref 0–200)
CO2 SERPL-SCNC: 25.4 MMOL/L (ref 22–29)
CREAT SERPL-MCNC: 0.96 MG/DL (ref 0.76–1.27)
CREAT UR-MCNC: 57.3 MG/DL
DEPRECATED RDW RBC AUTO: 42.8 FL (ref 37–54)
EGFRCR SERPLBLD CKD-EPI 2021: 95.1 ML/MIN/1.73
ERYTHROCYTE [DISTWIDTH] IN BLOOD BY AUTOMATED COUNT: 12.2 % (ref 12.3–15.4)
GLOBULIN UR ELPH-MCNC: 2.5 GM/DL
GLUCOSE SERPL-MCNC: 119 MG/DL (ref 65–99)
HBA1C MFR BLD: 6.1 % (ref 4.8–5.6)
HCT VFR BLD AUTO: 48.9 % (ref 37.5–51)
HDLC SERPL-MCNC: 38 MG/DL (ref 40–60)
HGB BLD-MCNC: 16.7 G/DL (ref 13–17.7)
LDLC SERPL CALC-MCNC: 112 MG/DL (ref 0–100)
LDLC/HDLC SERPL: 2.75 {RATIO}
MCH RBC QN AUTO: 32.4 PG (ref 26.6–33)
MCHC RBC AUTO-ENTMCNC: 34.2 G/DL (ref 31.5–35.7)
MCV RBC AUTO: 94.8 FL (ref 79–97)
MICROALBUMIN/CREAT UR: NORMAL MG/G{CREAT}
PLATELET # BLD AUTO: 211 10*3/MM3 (ref 140–450)
PMV BLD AUTO: 11.4 FL (ref 6–12)
POTASSIUM SERPL-SCNC: 4.4 MMOL/L (ref 3.5–5.2)
PROT SERPL-MCNC: 7.1 G/DL (ref 6–8.5)
RBC # BLD AUTO: 5.16 10*6/MM3 (ref 4.14–5.8)
SODIUM SERPL-SCNC: 141 MMOL/L (ref 136–145)
T4 FREE SERPL-MCNC: 1.57 NG/DL (ref 0.93–1.7)
TRIGL SERPL-MCNC: 262 MG/DL (ref 0–150)
TSH SERPL DL<=0.05 MIU/L-ACNC: 1.27 UIU/ML (ref 0.27–4.2)
VLDLC SERPL-MCNC: 45 MG/DL (ref 5–40)
WBC NRBC COR # BLD AUTO: 7.02 10*3/MM3 (ref 3.4–10.8)

## 2024-05-23 PROCEDURE — 36415 COLL VENOUS BLD VENIPUNCTURE: CPT

## 2024-05-23 PROCEDURE — 83036 HEMOGLOBIN GLYCOSYLATED A1C: CPT

## 2024-05-23 PROCEDURE — 82043 UR ALBUMIN QUANTITATIVE: CPT

## 2024-05-23 PROCEDURE — 80050 GENERAL HEALTH PANEL: CPT

## 2024-05-23 PROCEDURE — 84439 ASSAY OF FREE THYROXINE: CPT

## 2024-05-23 PROCEDURE — 82570 ASSAY OF URINE CREATININE: CPT

## 2024-05-23 PROCEDURE — 80061 LIPID PANEL: CPT

## 2024-05-28 ENCOUNTER — OFFICE VISIT (OUTPATIENT)
Dept: FAMILY MEDICINE CLINIC | Facility: CLINIC | Age: 54
End: 2024-05-28
Payer: COMMERCIAL

## 2024-05-28 VITALS
TEMPERATURE: 97.3 F | OXYGEN SATURATION: 96 % | WEIGHT: 206.4 LBS | HEART RATE: 103 BPM | BODY MASS INDEX: 30.57 KG/M2 | SYSTOLIC BLOOD PRESSURE: 132 MMHG | DIASTOLIC BLOOD PRESSURE: 86 MMHG | HEIGHT: 69 IN

## 2024-05-28 DIAGNOSIS — E03.9 HYPOTHYROIDISM, UNSPECIFIED TYPE: ICD-10-CM

## 2024-05-28 DIAGNOSIS — Z12.5 SCREENING FOR PROSTATE CANCER: ICD-10-CM

## 2024-05-28 DIAGNOSIS — E11.65 TYPE 2 DIABETES MELLITUS WITH HYPERGLYCEMIA, WITHOUT LONG-TERM CURRENT USE OF INSULIN: ICD-10-CM

## 2024-05-28 DIAGNOSIS — E78.5 HYPERLIPIDEMIA, UNSPECIFIED HYPERLIPIDEMIA TYPE: Primary | ICD-10-CM

## 2024-05-28 PROCEDURE — 99214 OFFICE O/P EST MOD 30 MIN: CPT | Performed by: NURSE PRACTITIONER

## 2024-05-28 RX ORDER — LISINOPRIL 5 MG/1
5 TABLET ORAL DAILY
Qty: 90 TABLET | Refills: 1 | Status: SHIPPED | OUTPATIENT
Start: 2024-05-28

## 2024-05-28 RX ORDER — DAPAGLIFLOZIN AND METFORMIN HYDROCHLORIDE 10; 1000 MG/1; MG/1
1 TABLET, FILM COATED, EXTENDED RELEASE ORAL DAILY
Qty: 90 TABLET | Refills: 1 | Status: SHIPPED | OUTPATIENT
Start: 2024-05-28

## 2024-05-28 RX ORDER — LEVOTHYROXINE SODIUM 0.07 MG/1
75 TABLET ORAL DAILY
Qty: 90 TABLET | Refills: 1 | Status: SHIPPED | OUTPATIENT
Start: 2024-05-28

## 2024-05-28 NOTE — PROGRESS NOTES
"Chief Complaint  Diabetes (6 month follow up )    Subjective         Jaciel Shaw presents to [facility]  Pt presents for diabetes follow-up. He states he has been doing well with no concerns. He has ongoing bilateral shoulder pain that he sees Dr Mancuso for. He has had PT and gets injections for pain. He states it is tolerable at this time and has recently started taking Aleve and wants to see how that helps him. Discussed recent labs.  A1c remains at 6.1%.  All other labs are unremarkable.  Med refill requested.  Denies any other concerns or complaints at this time    Diabetes         Objective     Vitals:    05/28/24 1542   BP: 132/86   BP Location: Right arm   Patient Position: Sitting   Cuff Size: Adult   Pulse: 103   Temp: 97.3 °F (36.3 °C)   TempSrc: Temporal   SpO2: 96%   Weight: 93.6 kg (206 lb 6.4 oz)   Height: 175.3 cm (69\")      Body mass index is 30.48 kg/m².    BMI is >= 30 and <35. (Class 1 Obesity). The following options were offered after discussion;: nutrition counseling/recommendations        Physical Exam  Vitals reviewed.   Constitutional:       Appearance: Normal appearance.   Cardiovascular:      Rate and Rhythm: Normal rate and regular rhythm.      Pulses: Normal pulses.      Heart sounds: Normal heart sounds, S1 normal and S2 normal. No murmur heard.  Pulmonary:      Effort: Pulmonary effort is normal. No respiratory distress.      Breath sounds: Normal breath sounds.   Skin:     General: Skin is warm and dry.   Neurological:      Mental Status: He is alert and oriented to person, place, and time.   Psychiatric:         Attention and Perception: Attention normal.         Mood and Affect: Mood normal.         Behavior: Behavior normal.          Result Review :   The following data was reviewed by: BAILEY Whitaker on 05/28/2024:      Procedures    Assessment and Plan   Diagnoses and all orders for this visit:    1. Hyperlipidemia, unspecified " hyperlipidemia type (Primary)  -     CBC (No Diff); Future  -     Comprehensive Metabolic Panel; Future  -     Lipid Panel; Future    2. Hypothyroidism, unspecified type  -     levothyroxine (SYNTHROID, LEVOTHROID) 75 MCG tablet; Take 1 tablet by mouth Daily.  Dispense: 90 tablet; Refill: 1  -     CBC (No Diff); Future  -     Comprehensive Metabolic Panel; Future  -     TSH; Future    3. Type 2 diabetes mellitus with hyperglycemia, without long-term current use of insulin  -     dapagliflozin-metformin HCl ER (Xigduo XR)  MG tablet; Take 1 tablet by mouth Daily.  Dispense: 90 tablet; Refill: 1  -     lisinopril (PRINIVIL,ZESTRIL) 5 MG tablet; Take 1 tablet by mouth Daily.  Dispense: 90 tablet; Refill: 1  -     CBC (No Diff); Future  -     Comprehensive Metabolic Panel; Future  -     Hemoglobin A1c; Future  -     Lipid Panel; Future  -     Microalbumin / Creatinine Urine Ratio - Urine, Clean Catch; Future  -     TSH; Future    4. Screening for prostate cancer  -     PSA Screen; Future          Follow Up   Return in about 6 months (around 11/28/2024).  Patient was given instructions and counseling regarding his condition or for health maintenance advice. Please see specific information pulled into the AVS if appropriate.

## 2024-11-07 ENCOUNTER — OFFICE VISIT (OUTPATIENT)
Dept: FAMILY MEDICINE CLINIC | Facility: CLINIC | Age: 54
End: 2024-11-07
Payer: COMMERCIAL

## 2024-11-07 VITALS
SYSTOLIC BLOOD PRESSURE: 130 MMHG | TEMPERATURE: 97.2 F | HEART RATE: 99 BPM | BODY MASS INDEX: 30.27 KG/M2 | WEIGHT: 204.4 LBS | HEIGHT: 69 IN | OXYGEN SATURATION: 96 % | DIASTOLIC BLOOD PRESSURE: 90 MMHG

## 2024-11-07 DIAGNOSIS — J01.40 ACUTE NON-RECURRENT PANSINUSITIS: Primary | ICD-10-CM

## 2024-11-07 DIAGNOSIS — R05.1 ACUTE COUGH: ICD-10-CM

## 2024-11-07 LAB
EXPIRATION DATE: NORMAL
FLUAV AG UPPER RESP QL IA.RAPID: NOT DETECTED
FLUBV AG UPPER RESP QL IA.RAPID: NOT DETECTED
INTERNAL CONTROL: NORMAL
Lab: NORMAL
SARS-COV-2 AG UPPER RESP QL IA.RAPID: NOT DETECTED

## 2024-11-07 PROCEDURE — 96372 THER/PROPH/DIAG INJ SC/IM: CPT

## 2024-11-07 PROCEDURE — 99213 OFFICE O/P EST LOW 20 MIN: CPT

## 2024-11-07 PROCEDURE — 87428 SARSCOV & INF VIR A&B AG IA: CPT

## 2024-11-07 RX ORDER — AZITHROMYCIN 250 MG/1
TABLET, FILM COATED ORAL
Qty: 6 TABLET | Refills: 0 | Status: SHIPPED | OUTPATIENT
Start: 2024-11-07

## 2024-11-07 RX ORDER — BENZONATATE 100 MG/1
100 CAPSULE ORAL 3 TIMES DAILY PRN
Qty: 60 CAPSULE | Refills: 0 | Status: SHIPPED | OUTPATIENT
Start: 2024-11-07

## 2024-11-07 RX ORDER — METHYLPREDNISOLONE ACETATE 80 MG/ML
80 INJECTION, SUSPENSION INTRA-ARTICULAR; INTRALESIONAL; INTRAMUSCULAR; SOFT TISSUE ONCE
Status: COMPLETED | OUTPATIENT
Start: 2024-11-07 | End: 2024-11-07

## 2024-11-07 RX ADMIN — METHYLPREDNISOLONE ACETATE 80 MG: 80 INJECTION, SUSPENSION INTRA-ARTICULAR; INTRALESIONAL; INTRAMUSCULAR; SOFT TISSUE at 16:03

## 2024-11-07 NOTE — PROGRESS NOTES
"Jaciel Shaw presents to [facility] with complaints of congestion and a cough for 5 days.      History of Present Illness    Roger is a 54-year-old male that comes to the clinic for acute visit with symptoms of congestion and cough x 5 days.    Patient states that he just got back from a cruise to [place] on Sunday.  States that he has had congestion, runny nose and a cough for 5 days.  He states that he does cough up yellow sputum.  Denies any known sick contacts.  Has not had a fever that he knows of.  Negative for flu and COVID.     The following portions of the patient's history were personally reviewed and updated as appropriate: allergies, current medications, past medical history, past surgical history, past family history, and past social history.       Objective   Vital Signs:   /90 (BP Location: Left arm, Patient Position: Sitting, Cuff Size: Adult)   Pulse 99   Temp 97.2 °F (36.2 °C)   Ht 175.3 cm (69.02\")   Wt 92.7 kg (204 lb 6.4 oz)   SpO2 96%   BMI 30.17 kg/m²     Body mass index is 30.17 kg/m².    All labs, imaging, test results, and specialty provider notes reviewed with patient.     Physical Exam  Vitals reviewed.   Constitutional:       Appearance: Normal appearance.   Cardiovascular:      Rate and Rhythm: Normal rate and regular rhythm.      Pulses: Normal pulses.      Heart sounds: Normal heart sounds.   Pulmonary:      Effort: Pulmonary effort is normal.      Breath sounds: Normal breath sounds.   Neurological:      General: No focal deficit present.      Mental Status: He is alert and oriented to person, place, and time.                 Assessment and Plan:  Diagnoses and all orders for this visit:    1. Acute non-recurrent pansinusitis (Primary)  -     azithromycin (Zithromax Z-Antonio) 250 MG tablet; Take 2 tablets by mouth on day 1, then 1 tablet daily on days 2-5  Dispense: 6 tablet; Refill: 0  -     benzonatate (Tessalon Perles) 100 MG " capsule; Take 1 capsule by mouth 3 (Three) Times a Day As Needed for Cough.  Dispense: 60 capsule; Refill: 0    2. Acute cough  -     POCT SARS-CoV-2 + Flu Antigen HUBERT  -     methylPREDNISolone acetate (DEPO-medrol) injection 80 mg    Negative for COVID and flu.  Educated patient to take full round of antibiotics.  Educated patient if his symptoms worsen or does not he does not get better after antibiotics to follow-up.  Also educated patient on ensuring he has adequate fluid intake and adequate sleep.      Follow Up:  No follow-ups on file.    Patient was given instructions and counseling regarding his condition or for health maintenance advice. Please see specific information pulled into the AVS if appropriate.

## 2024-11-25 ENCOUNTER — LAB (OUTPATIENT)
Facility: HOSPITAL | Age: 54
End: 2024-11-25
Payer: COMMERCIAL

## 2024-11-25 DIAGNOSIS — E03.9 HYPOTHYROIDISM, UNSPECIFIED TYPE: ICD-10-CM

## 2024-11-25 DIAGNOSIS — E78.5 HYPERLIPIDEMIA, UNSPECIFIED HYPERLIPIDEMIA TYPE: ICD-10-CM

## 2024-11-25 DIAGNOSIS — Z12.5 SCREENING FOR PROSTATE CANCER: ICD-10-CM

## 2024-11-25 DIAGNOSIS — E11.65 TYPE 2 DIABETES MELLITUS WITH HYPERGLYCEMIA, WITHOUT LONG-TERM CURRENT USE OF INSULIN: ICD-10-CM

## 2024-11-25 LAB
ALBUMIN SERPL-MCNC: 4.1 G/DL (ref 3.5–5.2)
ALBUMIN UR-MCNC: <1.2 MG/DL
ALBUMIN/GLOB SERPL: 1.3 G/DL
ALP SERPL-CCNC: 50 U/L (ref 39–117)
ALT SERPL W P-5'-P-CCNC: 17 U/L (ref 1–41)
ANION GAP SERPL CALCULATED.3IONS-SCNC: 13.5 MMOL/L (ref 5–15)
AST SERPL-CCNC: 15 U/L (ref 1–40)
BILIRUB SERPL-MCNC: 0.4 MG/DL (ref 0–1.2)
BUN SERPL-MCNC: 18 MG/DL (ref 6–20)
BUN/CREAT SERPL: 17 (ref 7–25)
CALCIUM SPEC-SCNC: 9.5 MG/DL (ref 8.6–10.5)
CHLORIDE SERPL-SCNC: 104 MMOL/L (ref 98–107)
CHOLEST SERPL-MCNC: 185 MG/DL (ref 0–200)
CO2 SERPL-SCNC: 23.5 MMOL/L (ref 22–29)
CREAT SERPL-MCNC: 1.06 MG/DL (ref 0.76–1.27)
CREAT UR-MCNC: 92.9 MG/DL
DEPRECATED RDW RBC AUTO: 39.1 FL (ref 37–54)
EGFRCR SERPLBLD CKD-EPI 2021: 84.4 ML/MIN/1.73
ERYTHROCYTE [DISTWIDTH] IN BLOOD BY AUTOMATED COUNT: 11.5 % (ref 12.3–15.4)
GLOBULIN UR ELPH-MCNC: 3.2 GM/DL
GLUCOSE SERPL-MCNC: 146 MG/DL (ref 65–99)
HBA1C MFR BLD: 6.4 % (ref 4.8–5.6)
HCT VFR BLD AUTO: 47.1 % (ref 37.5–51)
HDLC SERPL-MCNC: 41 MG/DL (ref 40–60)
HGB BLD-MCNC: 16.4 G/DL (ref 13–17.7)
LDLC SERPL CALC-MCNC: 117 MG/DL (ref 0–100)
LDLC/HDLC SERPL: 2.78 {RATIO}
MCH RBC QN AUTO: 32 PG (ref 26.6–33)
MCHC RBC AUTO-ENTMCNC: 34.8 G/DL (ref 31.5–35.7)
MCV RBC AUTO: 92 FL (ref 79–97)
MICROALBUMIN/CREAT UR: NORMAL MG/G{CREAT}
PLATELET # BLD AUTO: 283 10*3/MM3 (ref 140–450)
PMV BLD AUTO: 11.4 FL (ref 6–12)
POTASSIUM SERPL-SCNC: 4.4 MMOL/L (ref 3.5–5.2)
PROT SERPL-MCNC: 7.3 G/DL (ref 6–8.5)
PSA SERPL-MCNC: 0.37 NG/ML (ref 0–4)
RBC # BLD AUTO: 5.12 10*6/MM3 (ref 4.14–5.8)
SODIUM SERPL-SCNC: 141 MMOL/L (ref 136–145)
TRIGL SERPL-MCNC: 151 MG/DL (ref 0–150)
TSH SERPL DL<=0.05 MIU/L-ACNC: 0.97 UIU/ML (ref 0.27–4.2)
VLDLC SERPL-MCNC: 27 MG/DL (ref 5–40)
WBC NRBC COR # BLD AUTO: 7.61 10*3/MM3 (ref 3.4–10.8)

## 2024-11-25 PROCEDURE — 80050 GENERAL HEALTH PANEL: CPT

## 2024-11-25 PROCEDURE — 80061 LIPID PANEL: CPT

## 2024-11-25 PROCEDURE — 82570 ASSAY OF URINE CREATININE: CPT

## 2024-11-25 PROCEDURE — 82043 UR ALBUMIN QUANTITATIVE: CPT

## 2024-11-25 PROCEDURE — 36415 COLL VENOUS BLD VENIPUNCTURE: CPT

## 2024-11-25 PROCEDURE — G0103 PSA SCREENING: HCPCS

## 2024-11-25 PROCEDURE — 83036 HEMOGLOBIN GLYCOSYLATED A1C: CPT

## 2024-12-02 ENCOUNTER — OFFICE VISIT (OUTPATIENT)
Dept: FAMILY MEDICINE CLINIC | Facility: CLINIC | Age: 54
End: 2024-12-02
Payer: COMMERCIAL

## 2024-12-02 VITALS
TEMPERATURE: 97.3 F | HEIGHT: 69 IN | BODY MASS INDEX: 29.83 KG/M2 | OXYGEN SATURATION: 97 % | SYSTOLIC BLOOD PRESSURE: 134 MMHG | DIASTOLIC BLOOD PRESSURE: 80 MMHG | WEIGHT: 201.4 LBS | HEART RATE: 109 BPM

## 2024-12-02 DIAGNOSIS — E11.65 TYPE 2 DIABETES MELLITUS WITH HYPERGLYCEMIA, WITHOUT LONG-TERM CURRENT USE OF INSULIN: ICD-10-CM

## 2024-12-02 DIAGNOSIS — Z98.890 HX OF DETACHED RETINA REPAIR: ICD-10-CM

## 2024-12-02 DIAGNOSIS — E03.9 HYPOTHYROIDISM, UNSPECIFIED TYPE: ICD-10-CM

## 2024-12-02 DIAGNOSIS — Z86.69 HX OF DETACHED RETINA REPAIR: ICD-10-CM

## 2024-12-02 DIAGNOSIS — H57.89 EYE REDNESS: ICD-10-CM

## 2024-12-02 DIAGNOSIS — J30.9 ALLERGIC RHINITIS, UNSPECIFIED SEASONALITY, UNSPECIFIED TRIGGER: Primary | ICD-10-CM

## 2024-12-02 PROCEDURE — 99396 PREV VISIT EST AGE 40-64: CPT | Performed by: NURSE PRACTITIONER

## 2024-12-02 RX ORDER — LISINOPRIL 5 MG/1
5 TABLET ORAL DAILY
Qty: 90 TABLET | Refills: 1 | Status: SHIPPED | OUTPATIENT
Start: 2024-12-02

## 2024-12-02 RX ORDER — DAPAGLIFLOZIN AND METFORMIN HYDROCHLORIDE 10; 1000 MG/1; MG/1
1 TABLET, FILM COATED, EXTENDED RELEASE ORAL DAILY
Qty: 90 TABLET | Refills: 1 | Status: SHIPPED | OUTPATIENT
Start: 2024-12-02

## 2024-12-02 RX ORDER — LEVOTHYROXINE SODIUM 75 UG/1
75 TABLET ORAL DAILY
Qty: 90 TABLET | Refills: 1 | Status: SHIPPED | OUTPATIENT
Start: 2024-12-02

## 2024-12-02 RX ORDER — LORATADINE 10 MG/1
10 TABLET ORAL DAILY
Qty: 90 TABLET | Refills: 3 | Status: SHIPPED | OUTPATIENT
Start: 2024-12-02

## 2024-12-02 NOTE — PROGRESS NOTES
"Chief Complaint  Annual Exam    Subjective         Jaciel Shaw presents to [facility]  Patient presents today for annual wellness exam. Discussed recent lab results. Patient's A1c did go up slightly but states he recently went on two cruises and took a trip to [place] so his diet and exercise has been off and he expected those results. Discussed healthy diet and exercise.  Patient also requesting referral to [facility] for left eye redness. He states he has been using eye drops from an opthalmologist but he would like to see a specialist since he had a retinal detachment when he was 17.  Denies any other issues or concerns at this time.     Diabetes  He has type 2 diabetes mellitus. No MedicAlert identification noted. The initial diagnosis of diabetes was made 7 years ago. Hypoglycemia symptoms include tremors. Pertinent negatives for hypoglycemia include no confusion, headaches, speech difficulty or sweats. Pertinent negatives for diabetes include no blurred vision, no foot paresthesias, no foot ulcerations, no polydipsia, no polyuria and no weight loss. Symptoms are stable. He is compliant with treatment all of the time. He is following a generally healthy diet. When asked about meal planning, he reported none. He participates in exercise intermittently. He monitors blood glucose at home 1-2 x per week. His highest blood glucose is 140-180 mg/dl. He does not see a podiatrist. Eye exam is current.        Objective     Vitals:    12/02/24 1540   BP: 134/80   BP Location: Right arm   Patient Position: Sitting   Cuff Size: Adult   Pulse: 109   Temp: 97.3 °F (36.3 °C)   TempSrc: Temporal   SpO2: 97%   Weight: 91.4 kg (201 lb 6.4 oz)   Height: 175.3 cm (69\")      Body mass index is 29.74 kg/m².             Physical Exam  Vitals reviewed.   Constitutional:       Appearance: Normal appearance.   HENT:      Head: Normocephalic and atraumatic.      Right Ear: " Tympanic membrane, ear canal and external ear normal.      Left Ear: Tympanic membrane, ear canal and external ear normal.      Nose: Nose normal.      Mouth/Throat:      Mouth: Mucous membranes are moist.      Pharynx: Oropharynx is clear.   Eyes:      Extraocular Movements: Extraocular movements intact.      Conjunctiva/sclera: Conjunctivae normal.      Pupils: Pupils are equal, round, and reactive to light.   Cardiovascular:      Rate and Rhythm: Normal rate and regular rhythm.      Pulses: Normal pulses.      Heart sounds: Normal heart sounds.   Pulmonary:      Effort: Pulmonary effort is normal.      Breath sounds: Normal breath sounds.   Abdominal:      General: Abdomen is flat. Bowel sounds are normal.      Palpations: Abdomen is soft.   Musculoskeletal:         General: Normal range of motion.      Cervical back: Normal range of motion and neck supple.   Skin:     General: Skin is warm and dry.   Neurological:      General: No focal deficit present.      Mental Status: He is alert and oriented to person, place, and time.   Psychiatric:         Mood and Affect: Mood normal.         Behavior: Behavior normal.          Result Review :   The following data was reviewed by: BAILEY Whitaker on 12/02/2024:      Procedures    Assessment and Plan   Diagnoses and all orders for this visit:    1. Allergic rhinitis, unspecified seasonality, unspecified trigger (Primary)  -     loratadine (CLARITIN) 10 MG tablet; Take 1 tablet by mouth Daily.  Dispense: 90 tablet; Refill: 3    2. Type 2 diabetes mellitus with hyperglycemia, without long-term current use of insulin  -     dapagliflozin-metformin HCl ER (Xigduo XR)  MG tablet; Take 1 tablet by mouth Daily.  Dispense: 90 tablet; Refill: 1  -     lisinopril (PRINIVIL,ZESTRIL) 5 MG tablet; Take 1 tablet by mouth Daily.  Dispense: 90 tablet; Refill: 1  -     CBC (No Diff); Future  -     Comprehensive Metabolic Panel; Future  -     Lipid Panel; Future  -     TSH+Free  T4; Future  -     Hemoglobin A1c; Future  -     Microalbumin / Creatinine Urine Ratio - Urine, Clean Catch; Future    3. Hypothyroidism, unspecified type  -     levothyroxine (SYNTHROID, LEVOTHROID) 75 MCG tablet; Take 1 tablet by mouth Daily.  Dispense: 90 tablet; Refill: 1  -     TSH+Free T4; Future    4. Eye redness  -     Ambulatory Referral to Ophthalmology    5. Hx of detached retina repair  -     Ambulatory Referral to Ophthalmology      Preventative counseling includes healthy diet and exercise.  Also discussed colon cancer screening as well as prostate cancer screening    Follow Up   Return in about 6 months (around 6/2/2025) for Recheck.  Patient was given instructions and counseling regarding his condition or for health maintenance advice. Please see specific information pulled into the AVS if appropriate.

## 2025-05-14 ENCOUNTER — CLINICAL SUPPORT NO REQUIREMENTS (OUTPATIENT)
Facility: HOSPITAL | Age: 55
End: 2025-05-14
Payer: COMMERCIAL

## 2025-05-14 DIAGNOSIS — Z13.9 SCREENING DUE: Primary | ICD-10-CM

## 2025-05-28 VITALS — DIASTOLIC BLOOD PRESSURE: 83 MMHG | SYSTOLIC BLOOD PRESSURE: 135 MMHG

## 2025-05-28 LAB
CHOLEST BLD STRIP: 203 MG/DL
CHOLEST SERPL-MCNC: 203 MG/DL (ref 0–200)
EXPIRATION DATE: 0
GLUCOSE BLDC GLUCOMTR-MCNC: 153 MG/DL (ref 70–130)
HDLC SERPL-MCNC: 41 MG/DL (ref 40–60)
LDLC SERPL CALC-MCNC: 162 MG/DL (ref 0–100)
Lab: 0
POC HIGH DENSITY CHOLESTEROL: 5
TRIGL SERPL-MCNC: 292 MG/DL (ref 0–150)

## 2025-05-28 NOTE — PROGRESS NOTES
Employee Health Risk Screening Chart Note    Date/Time: 25 11:26 EDT  Location: [facility]  [address] [place] [address]  Phone [phone]   Patient Name: Jaciel Shaw   : 1970  Reason for Visit: Health risk screening and preventive care.    Screening Results  Cholesterol Panel:  Lipid Panel          2024    07:40 2025    11:25   Lipid Panel   Total Cholesterol 185  203    Triglycerides 151  292    HDL Cholesterol 41  41    VLDL Cholesterol 27     LDL Cholesterol  117  162    LDL/HDL Ratio 2.78        Blood Glucose:   Blood Sugar in Office          2025    11:25   Blood Sugar in Office   Glucose 153       Blood Pressure: [unfilled]       Education and Resources Provided  Health Information Topics Discussed:  Cholesterol management: YES  Thyroid health: YES  Blood glucose control: YES  Blood pressure management: YES  BMI and weight management: YES  Nutrition and heart health: YES  Resources Provided:  Provider Referral:   Community Resources: N/A  Educational Materials Given:       Follow-Up Plan:  Patient advised to follow up with primary care provider.  Additional screenings/tests recommended: N/A    Additional Notes  Patient verbalized understanding of provided education and resources: YES  Patient questions or concerns: N/A  Plan discussed with patient: Yes

## 2025-05-29 ENCOUNTER — LAB (OUTPATIENT)
Facility: HOSPITAL | Age: 55
End: 2025-05-29
Payer: COMMERCIAL

## 2025-05-29 DIAGNOSIS — E11.65 TYPE 2 DIABETES MELLITUS WITH HYPERGLYCEMIA, WITHOUT LONG-TERM CURRENT USE OF INSULIN: ICD-10-CM

## 2025-05-29 DIAGNOSIS — E03.9 HYPOTHYROIDISM, UNSPECIFIED TYPE: ICD-10-CM

## 2025-05-29 LAB
ALBUMIN SERPL-MCNC: 4.3 G/DL (ref 3.5–5.2)
ALBUMIN UR-MCNC: <1.2 MG/DL
ALBUMIN/GLOB SERPL: 1.8 G/DL
ALP SERPL-CCNC: 48 U/L (ref 39–117)
ALT SERPL W P-5'-P-CCNC: 30 U/L (ref 1–41)
ANION GAP SERPL CALCULATED.3IONS-SCNC: 12 MMOL/L (ref 5–15)
AST SERPL-CCNC: 27 U/L (ref 1–40)
BILIRUB SERPL-MCNC: 0.8 MG/DL (ref 0–1.2)
BUN SERPL-MCNC: 14 MG/DL (ref 6–20)
BUN/CREAT SERPL: 13.6 (ref 7–25)
CALCIUM SPEC-SCNC: 9.1 MG/DL (ref 8.6–10.5)
CHLORIDE SERPL-SCNC: 101 MMOL/L (ref 98–107)
CHOLEST SERPL-MCNC: 180 MG/DL (ref 0–200)
CO2 SERPL-SCNC: 23 MMOL/L (ref 22–29)
CREAT SERPL-MCNC: 1.03 MG/DL (ref 0.76–1.27)
CREAT UR-MCNC: 75.8 MG/DL
DEPRECATED RDW RBC AUTO: 40.5 FL (ref 37–54)
EGFRCR SERPLBLD CKD-EPI 2021: 86.9 ML/MIN/1.73
ERYTHROCYTE [DISTWIDTH] IN BLOOD BY AUTOMATED COUNT: 11.7 % (ref 12.3–15.4)
GLOBULIN UR ELPH-MCNC: 2.4 GM/DL
GLUCOSE SERPL-MCNC: 161 MG/DL (ref 65–99)
HBA1C MFR BLD: 6.8 % (ref 4.8–5.6)
HCT VFR BLD AUTO: 46.7 % (ref 37.5–51)
HDLC SERPL-MCNC: 31 MG/DL (ref 40–60)
HGB BLD-MCNC: 16.3 G/DL (ref 13–17.7)
LDLC SERPL CALC-MCNC: 107 MG/DL (ref 0–100)
LDLC/HDLC SERPL: 3.25 {RATIO}
MCH RBC QN AUTO: 33 PG (ref 26.6–33)
MCHC RBC AUTO-ENTMCNC: 34.9 G/DL (ref 31.5–35.7)
MCV RBC AUTO: 94.5 FL (ref 79–97)
MICROALBUMIN/CREAT UR: NORMAL MG/G{CREAT}
PLATELET # BLD AUTO: 245 10*3/MM3 (ref 140–450)
PMV BLD AUTO: 11 FL (ref 6–12)
POTASSIUM SERPL-SCNC: 4.5 MMOL/L (ref 3.5–5.2)
PROT SERPL-MCNC: 6.7 G/DL (ref 6–8.5)
RBC # BLD AUTO: 4.94 10*6/MM3 (ref 4.14–5.8)
SODIUM SERPL-SCNC: 136 MMOL/L (ref 136–145)
T4 FREE SERPL-MCNC: 1.8 NG/DL (ref 0.92–1.68)
TRIGL SERPL-MCNC: 241 MG/DL (ref 0–150)
TSH SERPL DL<=0.05 MIU/L-ACNC: 1.02 UIU/ML (ref 0.27–4.2)
VLDLC SERPL-MCNC: 42 MG/DL (ref 5–40)
WBC NRBC COR # BLD AUTO: 8.53 10*3/MM3 (ref 3.4–10.8)

## 2025-05-29 PROCEDURE — 82570 ASSAY OF URINE CREATININE: CPT

## 2025-05-29 PROCEDURE — 84439 ASSAY OF FREE THYROXINE: CPT

## 2025-05-29 PROCEDURE — 80061 LIPID PANEL: CPT

## 2025-05-29 PROCEDURE — 36415 COLL VENOUS BLD VENIPUNCTURE: CPT

## 2025-05-29 PROCEDURE — 82043 UR ALBUMIN QUANTITATIVE: CPT

## 2025-05-29 PROCEDURE — 83036 HEMOGLOBIN GLYCOSYLATED A1C: CPT

## 2025-05-29 PROCEDURE — 80050 GENERAL HEALTH PANEL: CPT

## 2025-06-02 ENCOUNTER — OFFICE VISIT (OUTPATIENT)
Dept: FAMILY MEDICINE CLINIC | Facility: CLINIC | Age: 55
End: 2025-06-02
Payer: COMMERCIAL

## 2025-06-02 VITALS
SYSTOLIC BLOOD PRESSURE: 116 MMHG | HEIGHT: 69 IN | OXYGEN SATURATION: 96 % | HEART RATE: 113 BPM | WEIGHT: 204.2 LBS | DIASTOLIC BLOOD PRESSURE: 76 MMHG | TEMPERATURE: 97.6 F | BODY MASS INDEX: 30.24 KG/M2

## 2025-06-02 DIAGNOSIS — Z00.00 WELL ADULT EXAM: Primary | ICD-10-CM

## 2025-06-02 DIAGNOSIS — Z12.5 SCREENING FOR PROSTATE CANCER: ICD-10-CM

## 2025-06-02 DIAGNOSIS — E03.9 HYPOTHYROIDISM, UNSPECIFIED TYPE: ICD-10-CM

## 2025-06-02 DIAGNOSIS — Z87.891 HISTORY OF TOBACCO ABUSE: ICD-10-CM

## 2025-06-02 DIAGNOSIS — E78.5 HYPERLIPIDEMIA, UNSPECIFIED HYPERLIPIDEMIA TYPE: ICD-10-CM

## 2025-06-02 DIAGNOSIS — E11.65 TYPE 2 DIABETES MELLITUS WITH HYPERGLYCEMIA, WITHOUT LONG-TERM CURRENT USE OF INSULIN: ICD-10-CM

## 2025-06-02 RX ORDER — LISINOPRIL 5 MG/1
5 TABLET ORAL DAILY
Qty: 90 TABLET | Refills: 1 | Status: SHIPPED | OUTPATIENT
Start: 2025-06-02

## 2025-06-02 RX ORDER — LEVOTHYROXINE SODIUM 75 UG/1
75 TABLET ORAL DAILY
Qty: 90 TABLET | Refills: 1 | Status: SHIPPED | OUTPATIENT
Start: 2025-06-02

## 2025-06-02 RX ORDER — DAPAGLIFLOZIN AND METFORMIN HYDROCHLORIDE 10; 1000 MG/1; MG/1
1 TABLET, FILM COATED, EXTENDED RELEASE ORAL DAILY
Qty: 90 TABLET | Refills: 1 | Status: SHIPPED | OUTPATIENT
Start: 2025-06-02

## 2025-06-02 NOTE — PROGRESS NOTES
"Chief Complaint  Annual Exam    Subjective         Jaciel Shaw presents to [facility]  Patient presents to the office today for annual wellness check. Discussed recent lab work. He is due for low dose CT for lung CA screening. Last one was in 2023.   He is currently taking lisinopril. BP well-controlled. He denies any issues or concerns at this time.         Objective     Vitals:    06/02/25 1533   BP: 116/76   BP Location: Right arm   Patient Position: Sitting   Cuff Size: Adult   Pulse: 113   Temp: 97.6 °F (36.4 °C)   TempSrc: Temporal   SpO2: 96%   Weight: 92.6 kg (204 lb 3.2 oz)   Height: 175.3 cm (69\")      Body mass index is 30.16 kg/m².    BMI is >= 30 and <35. (Class 1 Obesity). The following options were offered after discussion;: nutrition counseling/recommendations        Physical Exam  Vitals reviewed.   Constitutional:       Appearance: Normal appearance. He is well-developed.   HENT:      Head: Normocephalic and atraumatic.      Right Ear: External ear normal.      Left Ear: External ear normal.      Mouth/Throat:      Pharynx: No oropharyngeal exudate.   Eyes:      Conjunctiva/sclera: Conjunctivae normal.      Pupils: Pupils are equal, round, and reactive to light.   Cardiovascular:      Rate and Rhythm: Normal rate and regular rhythm.      Heart sounds: No murmur heard.     No friction rub. No gallop.   Pulmonary:      Effort: Pulmonary effort is normal.      Breath sounds: Normal breath sounds. No wheezing or rhonchi.   Abdominal:      General: Bowel sounds are normal. There is no distension.      Palpations: Abdomen is soft.      Tenderness: There is no abdominal tenderness.   Skin:     General: Skin is warm and dry.   Neurological:      Mental Status: He is alert and oriented to person, place, and time.      Cranial Nerves: No cranial nerve deficit.   Psychiatric:         Mood and Affect: Mood and affect normal.         Behavior: Behavior normal.         " Thought Content: Thought content normal.         Judgment: Judgment normal.          Result Review :   The following data was reviewed by: BAILEY Whitaker on 06/02/2025:      Procedures    Assessment and Plan   Diagnoses and all orders for this visit:    1. Well adult exam (Primary)    2. Type 2 diabetes mellitus with hyperglycemia, without long-term current use of insulin  -     dapagliflozin-metformin HCl ER (Xigduo XR)  MG tablet; Take 1 tablet by mouth Daily.  Dispense: 90 tablet; Refill: 1  -     lisinopril (PRINIVIL,ZESTRIL) 5 MG tablet; Take 1 tablet by mouth Daily.  Dispense: 90 tablet; Refill: 1  -     CBC (No Diff); Future  -     Comprehensive Metabolic Panel; Future  -     Hemoglobin A1c; Future  -     Lipid Panel; Future  -     Microalbumin / Creatinine Urine Ratio - Urine, Clean Catch; Future  -     TSH; Future    3. Hypothyroidism, unspecified type  -     levothyroxine (SYNTHROID, LEVOTHROID) 75 MCG tablet; Take 1 tablet by mouth Daily.  Dispense: 90 tablet; Refill: 1  -     TSH; Future    4. Hyperlipidemia, unspecified hyperlipidemia type  -     CBC (No Diff); Future  -     Comprehensive Metabolic Panel; Future  -     Lipid Panel; Future    5. Screening for prostate cancer  -     PSA Screen; Future    6. History of tobacco abuse  -     CT Chest Low Dose Wo; Future      Preventative counseling provided regarding healthy diet and exercise.    Follow Up   Return in about 1 year (around 6/2/2026) for Annual physical.  Patient was given instructions and counseling regarding his condition or for health maintenance advice. Please see specific information pulled into the AVS if appropriate.

## 2025-07-29 ENCOUNTER — HOSPITAL ENCOUNTER (OUTPATIENT)
Dept: CT IMAGING | Facility: HOSPITAL | Age: 55
Discharge: HOME | End: 2025-07-29
Admitting: NURSE PRACTITIONER
Payer: COMMERCIAL

## 2025-07-29 DIAGNOSIS — Z87.891 HISTORY OF TOBACCO ABUSE: ICD-10-CM

## 2025-07-29 PROCEDURE — 71271 CT THORAX LUNG CANCER SCR C-: CPT

## (undated) DEVICE — GLV SURG SENSICARE PI ORTHO SZ8 LF STRL

## (undated) DEVICE — Device: Brand: DEFENDO AIR/WATER/SUCTION AND BIOPSY VALVE

## (undated) DEVICE — CONN JET HYDRA H20 AUXILIARY DISP

## (undated) DEVICE — SOL IRRG H2O PL/BG 1000ML STRL

## (undated) DEVICE — STERILE POLYISOPRENE POWDER-FREE SURGICAL GLOVES WITH EMOLLIENT COATING: Brand: PROTEXIS

## (undated) DEVICE — SOL IRR NACL 0.9PCT BT 1000ML

## (undated) DEVICE — SOLIDIFIER LIQLOC PLS 1500CC BT

## (undated) DEVICE — LINER SURG CANSTR SXN S/RIGD 1500CC

## (undated) DEVICE — THE SINGLE USE ETRAP – POLYP TRAP IS USED FOR SUCTION RETRIEVAL OF ENDOSCOPICALLY REMOVED POLYPS.: Brand: ETRAP

## (undated) DEVICE — Device

## (undated) DEVICE — SNAR POLYP CAPTIFLEX XS/OVL 11X2.4MM 240CM 1P/U

## (undated) DEVICE — GOWN,REINFRCE,POLY,SIRUS,BREATH SLV,XXLG: Brand: MEDLINE